# Patient Record
Sex: FEMALE | Race: WHITE | NOT HISPANIC OR LATINO | URBAN - METROPOLITAN AREA
[De-identification: names, ages, dates, MRNs, and addresses within clinical notes are randomized per-mention and may not be internally consistent; named-entity substitution may affect disease eponyms.]

---

## 2023-11-20 ENCOUNTER — OFFICE VISIT (OUTPATIENT)
Dept: OBGYN CLINIC | Facility: CLINIC | Age: 33
End: 2023-11-20
Payer: COMMERCIAL

## 2023-11-20 VITALS
DIASTOLIC BLOOD PRESSURE: 80 MMHG | HEIGHT: 65 IN | BODY MASS INDEX: 22.96 KG/M2 | SYSTOLIC BLOOD PRESSURE: 120 MMHG | WEIGHT: 137.8 LBS

## 2023-11-20 DIAGNOSIS — N91.2 AMENORRHEA: Primary | ICD-10-CM

## 2023-11-20 PROCEDURE — 76817 TRANSVAGINAL US OBSTETRIC: CPT | Performed by: PHYSICIAN ASSISTANT

## 2023-11-20 PROCEDURE — 99214 OFFICE O/P EST MOD 30 MIN: CPT | Performed by: PHYSICIAN ASSISTANT

## 2023-11-20 NOTE — PROGRESS NOTES
Assessment/Plan:  - Viable IUP @ 8w 5d EGA  - CALLY 24  - Continue PNV  - Patient to call for concerns  - RTO 3 weeks for OB intake    Encounter Diagnosis     ICD-10-CM    1. Amenorrhea  N91.2 Ambulatory Referral to Maternal Fetal Medicine                Subjective:       Patient ID: Gena Gomez 1990        Gena Gomez is a 34 y.o.  presenting to the office for pregnancy confirmation. Patient's last menstrual period was 2023 (exact date). , placing her at [id number] [facility] today with CALLY of 24. She is feeling well today        OB History    Para Term  AB Living   1             SAB IAB Ectopic Multiple Live Births                  # Outcome Date GA Lbr Jorden/2nd Weight Sex Delivery Anes PTL Lv   1 Current               Obstetric Comments   Menarche 17          The following portions of the patient's history were reviewed and updated as appropriate: allergies, current medications, past family history, past medical history, past social history, past surgical history, and problem list.    Allergies:  Patient has no known allergies. Medications:    Current Outpatient Medications:     Prenatal MV-Min-Fe Fum-FA-DHA (PRENATAL 1 PO), Take by mouth, Disp: , Rfl:       Review of Systems:   Review of Systems   Constitutional:  Negative for chills, fever and unexpected weight change. Respiratory:  Negative for shortness of breath. Cardiovascular:  Negative for chest pain. Gastrointestinal:  Negative for abdominal pain, diarrhea, nausea and vomiting. Skin:  Negative for rash. Psychiatric/Behavioral:  Negative for dysphoric mood. The patient is not nervous/anxious.            Objective:       Visit Vitals  /80 (BP Location: Left arm, Patient Position: Sitting, Cuff Size: Standard)   Ht 5' 5" (1.651 m)   Wt 62.5 kg (137 lb 12.8 oz)   LMP 2023 (Exact Date)   BMI 22.93 kg/m²   OB Status Pregnant   Smoking Status Never   BSA 1.69 m²        GEN: The patient was alert and oriented x3, pleasant well-appearing female in no acute distress. CV: Regular rate  PULM: nonlabored respirations  MSK: Normal gait  : WNL  Skin: warm, dry  Neuro: no focal deficits  Psych: normal affect and judgement, cooperative    Ultrasound:     Viability US     Gestational sac: present               Location: intrauterine  Yolk sac: present  Fetal pole: present               CRL: 1.77 cm = 8w2d  Cardiac activity: present               Rate: 164 bpm     Ovaries: normal appearing bilaterally  Cul de sac: absence of free fluid  Uterus: normal in appearance           Ultrasound Probe Disinfection    A transvaginal ultrasound was performed.    Prior to use, disinfection was performed with High Level Disinfection Process ([employer]on)  Probe serial number [id number]: [id number] was used    Reuben Bhatti PA-C  11/20/23  10:30 AM

## 2023-12-08 ENCOUNTER — LAB (OUTPATIENT)
Dept: LAB | Facility: AMBULARY SURGERY CENTER | Age: 33
End: 2023-12-08
Payer: COMMERCIAL

## 2023-12-08 ENCOUNTER — INITIAL PRENATAL (OUTPATIENT)
Dept: OBGYN CLINIC | Facility: CLINIC | Age: 33
End: 2023-12-08

## 2023-12-08 VITALS — HEIGHT: 65 IN | WEIGHT: 135.6 LBS | BODY MASS INDEX: 22.59 KG/M2

## 2023-12-08 DIAGNOSIS — Z3A.11 11 WEEKS GESTATION OF PREGNANCY: ICD-10-CM

## 2023-12-08 DIAGNOSIS — Z34.91 ENCOUNTER FOR SUPERVISION OF NORMAL PREGNANCY IN FIRST TRIMESTER, UNSPECIFIED GRAVIDITY: ICD-10-CM

## 2023-12-08 DIAGNOSIS — Z31.438 ENCOUNTER FOR OTHER GENETIC TESTING OF FEMALE FOR PROCREATIVE MANAGEMENT: ICD-10-CM

## 2023-12-08 DIAGNOSIS — Z13.71 SCREENING FOR GENETIC DISEASE CARRIER STATUS: Primary | ICD-10-CM

## 2023-12-08 LAB
ABO GROUP BLD: NORMAL
BASOPHILS # BLD AUTO: 0.02 THOUSANDS/ÂΜL (ref 0–0.1)
BASOPHILS NFR BLD AUTO: 0 % (ref 0–1)
BLD GP AB SCN SERPL QL: NEGATIVE
EOSINOPHIL # BLD AUTO: 0.01 THOUSAND/ÂΜL (ref 0–0.61)
EOSINOPHIL NFR BLD AUTO: 0 % (ref 0–6)
ERYTHROCYTE [DISTWIDTH] IN BLOOD BY AUTOMATED COUNT: 12.3 % (ref 11.6–15.1)
HBV SURFACE AG SER QL: NORMAL
HCT VFR BLD AUTO: 35.6 % (ref 34.8–46.1)
HCV AB SER QL: NORMAL
HGB BLD-MCNC: 11.7 G/DL (ref 11.5–15.4)
IMM GRANULOCYTES # BLD AUTO: 0.03 THOUSAND/UL (ref 0–0.2)
IMM GRANULOCYTES NFR BLD AUTO: 0 % (ref 0–2)
LYMPHOCYTES # BLD AUTO: 2.04 THOUSANDS/ÂΜL (ref 0.6–4.47)
LYMPHOCYTES NFR BLD AUTO: 23 % (ref 14–44)
MCH RBC QN AUTO: 29 PG (ref 26.8–34.3)
MCHC RBC AUTO-ENTMCNC: 32.9 G/DL (ref 31.4–37.4)
MCV RBC AUTO: 88 FL (ref 82–98)
MONOCYTES # BLD AUTO: 0.4 THOUSAND/ÂΜL (ref 0.17–1.22)
MONOCYTES NFR BLD AUTO: 5 % (ref 4–12)
NEUTROPHILS # BLD AUTO: 6.4 THOUSANDS/ÂΜL (ref 1.85–7.62)
NEUTS SEG NFR BLD AUTO: 72 % (ref 43–75)
NRBC BLD AUTO-RTO: 0 /100 WBCS
PLATELET # BLD AUTO: 225 THOUSANDS/UL (ref 149–390)
PMV BLD AUTO: 11 FL (ref 8.9–12.7)
RBC # BLD AUTO: 4.04 MILLION/UL (ref 3.81–5.12)
RH BLD: NEGATIVE
RUBV IGG SERPL IA-ACNC: 190.5 IU/ML
SPECIMEN EXPIRATION DATE: NORMAL
TREPONEMA PALLIDUM IGG+IGM AB [PRESENCE] IN SERUM OR PLASMA BY IMMUNOASSAY: NORMAL
VZV IGG SER QL IA: NORMAL
WBC # BLD AUTO: 8.9 THOUSAND/UL (ref 4.31–10.16)

## 2023-12-08 PROCEDURE — 86787 VARICELLA-ZOSTER ANTIBODY: CPT

## 2023-12-08 PROCEDURE — OBC

## 2023-12-08 PROCEDURE — 86850 RBC ANTIBODY SCREEN: CPT

## 2023-12-08 PROCEDURE — 36415 COLL VENOUS BLD VENIPUNCTURE: CPT

## 2023-12-08 PROCEDURE — 85025 COMPLETE CBC W/AUTO DIFF WBC: CPT

## 2023-12-08 PROCEDURE — 86900 BLOOD TYPING SEROLOGIC ABO: CPT

## 2023-12-08 PROCEDURE — 86780 TREPONEMA PALLIDUM: CPT

## 2023-12-08 PROCEDURE — 87340 HEPATITIS B SURFACE AG IA: CPT

## 2023-12-08 PROCEDURE — 87086 URINE CULTURE/COLONY COUNT: CPT

## 2023-12-08 PROCEDURE — 87389 HIV-1 AG W/HIV-1&-2 AB AG IA: CPT

## 2023-12-08 PROCEDURE — 86803 HEPATITIS C AB TEST: CPT

## 2023-12-08 PROCEDURE — 86762 RUBELLA ANTIBODY: CPT

## 2023-12-08 PROCEDURE — 86901 BLOOD TYPING SEROLOGIC RH(D): CPT

## 2023-12-08 NOTE — PROGRESS NOTES
OB INTAKE INTERVIEW  Pt presents for OB intake. Plan:  - Prenatal labs ordered  - Referral given for MFM   -NT scheduled for   - Reviewed Genetic testing options   -SMA and CF ordered   - Patient to call for concerns  - RTO 4 weeks for OB F/U visit and PAP/Cultures       OB History    Para Term  AB Living   1             SAB IAB Ectopic Multiple Live Births                  # Outcome Date GA Lbr Jorden/2nd Weight Sex Delivery Anes PTL Lv   1 Current               Obstetric Comments   Menarche 15      Hx of  delivery prior to 36 weeks 6 days:  No  Last Menstrual Period:    Patient's last menstrual period was 2023 (exact date). Ultrasound date: 23 8 weeks 5 days     Estimated Date of Delivery: 2024     Current Issues:  Constipation :   No  Headaches :   No  Cramping:  No  Spotting :   No      Interview education  [facility] Pregnancy Essentials reviewed and discussed   Baby and  [address] Handout  [facility] MFM Handout  Discussed genetic testing  Prenatal lab work: Scripts printed and given to pt. Influenza vaccine given today: No  Discussed Tdap vaccine. Immunizations:   Immunization History   Administered Date(s) Administered    COVID-19 MODERNA VACC 0.5 ML IM 12/15/2021     Diabetes              Pregestational DM: No              hx of GDM: No              BMI >35: No              first degree relative with type 2 diabetes: No              hx of PCOS: No              current metformin use:  No              prior hx of LGA/macrosomia: No                Hypertension              Hx of chronic HTN: No              hx of gestational HTN: No              hx of preeclampsia, eclampsia, or HELLP syndrome: No              Family h/o preeclampsia: No              Age 28 or older: No              Multifetal gestation: No  Type 1 or Type 2 DM: No  Renal Disease: No  Autoimmune disease (systemic lupus erythematosus, antiphospholipid antibody syndrome): No  Nulliparity: Yes  Obesity (BMI over 30): No  More than 10 year pregnancy interval: No  Previous IUGR, low birthweight or small for gestational age: No      Immunizations:              influenza vaccine: discussed recommendation, unsure at this time               discussed Tdap vaccine administration at 27-28 weeks   Covid Vaccination: Vaccinated     Dental visit with last 6 months - Yes  PHQ-2/9 score: 0       MyChart activated (not 1518 years of age)?: Yes      The patient was oriented to our practice and all questions were answered.   Interviewed by: Filippo Simpson RN 12/08/23

## 2023-12-09 LAB
BACTERIA UR CULT: NORMAL
HIV 1+2 AB+HIV1 P24 AG SERPL QL IA: NON REACTIVE

## 2023-12-20 ENCOUNTER — ROUTINE PRENATAL (OUTPATIENT)
Facility: HOSPITAL | Age: 33
End: 2023-12-20
Payer: COMMERCIAL

## 2023-12-20 VITALS
HEIGHT: 65 IN | DIASTOLIC BLOOD PRESSURE: 70 MMHG | BODY MASS INDEX: 22.73 KG/M2 | SYSTOLIC BLOOD PRESSURE: 116 MMHG | WEIGHT: 136.4 LBS | HEART RATE: 66 BPM

## 2023-12-20 DIAGNOSIS — Z3A.13 13 WEEKS GESTATION OF PREGNANCY: ICD-10-CM

## 2023-12-20 DIAGNOSIS — Z36.82 ENCOUNTER FOR (NT) NUCHAL TRANSLUCENCY SCAN: Primary | ICD-10-CM

## 2023-12-20 DIAGNOSIS — N91.2 AMENORRHEA: ICD-10-CM

## 2023-12-20 PROCEDURE — 99242 OFF/OP CONSLTJ NEW/EST SF 20: CPT | Performed by: NURSE PRACTITIONER

## 2023-12-20 PROCEDURE — 76813 OB US NUCHAL MEAS 1 GEST: CPT | Performed by: OBSTETRICS & GYNECOLOGY

## 2023-12-20 NOTE — LETTER
"2023     Gill Metcalf PA-C  [address]  [address]  [place] [address]    Patient: Lisa Smiley   YOB: 1990   Date of Visit: 2023       Dear GABRIELLA Metcalf:    Thank you for referring Lisa Smiley to me for evaluation. Below are my notes for this consultation.    If you have questions, please do not hesitate to call me. I look forward to following your patient along with you.         Sincerely,        Greer Sexton MD        CC: No Recipients    Greer Sexton MD  2023  1:24 PM  Sign when Signing Visit  [facility]: Ms. Smiley was seen today at 13w0d for nuchal translucency ultrasound.  See ultrasound report under \"OB Procedures\" tab.      Our recommendations are as follows:  We reviewed the availability of aneuploidy screening, as well as diagnostic testing, which are available to all pregnant women. We reviewed limitations, risks, and benefits of screening and testing. She is interested in Non Invasive Prenatal Screening (NIPS) but needs to check coverage with insurance. Aware to call to schedule blood draw if desires testing.  MSAFP screening should be ordered through your office at 15-20 weeks gestation, and completed prior to fetal anatomic survey.  Additionally, carrier screening should be offered, per ACOG guidelines.  She does not wish to pursue diagnostic testing at this time. A detailed anatomic survey as well as transvaginal cervical length screening are recommended between 18-22 weeks gestation.     We reviewed current recommendations regarding  Flu, COVID and RSV (third trimester) vaccines by the American College of Obstetricians and Gynecologists and the Society for Maternal-Fetal Medicine. We discussed reassuring pregnancy outcome information after vaccination. We discussed the increased severity of infections and the resultant maternal and fetal complications that can arise with a severe infection including "  labor.  Vaccines have been found to generate  antibodies in pregnant and lactating women similar to that observed in non-pregnant women. Vaccine-induced antibody levels were significantly greater than the levels found in response to natural infection. Immune transfer of these antibodies to neonates is found to occur via the placenta and breast milk.    Please don't hesitate to contact our office with any concerns or questions.    -Greer Sexton MD

## 2023-12-20 NOTE — PROGRESS NOTES
"[facility]: Ms. Smiley was seen today at 13w0d for nuchal translucency ultrasound.  See ultrasound report under \"OB Procedures\" tab.      Our recommendations are as follows:  We reviewed the availability of aneuploidy screening, as well as diagnostic testing, which are available to all pregnant women. We reviewed limitations, risks, and benefits of screening and testing. She is interested in Non Invasive Prenatal Screening (NIPS) but needs to check coverage with insurance. Aware to call to schedule blood draw if desires testing.  MSAFP screening should be ordered through your office at 15-20 weeks gestation, and completed prior to fetal anatomic survey.  Additionally, carrier screening should be offered, per ACOG guidelines.  She does not wish to pursue diagnostic testing at this time. A detailed anatomic survey as well as transvaginal cervical length screening are recommended between 18-22 weeks gestation.     We reviewed current recommendations regarding  Flu, COVID and RSV (third trimester) vaccines by the American College of Obstetricians and Gynecologists and the Society for Maternal-Fetal Medicine. We discussed reassuring pregnancy outcome information after vaccination. We discussed the increased severity of infections and the resultant maternal and fetal complications that can arise with a severe infection including  labor.  Vaccines have been found to generate  antibodies in pregnant and lactating women similar to that observed in non-pregnant women. Vaccine-induced antibody levels were significantly greater than the levels found in response to natural infection. Immune transfer of these antibodies to neonates is found to occur via the placenta and breast milk.    Please don't hesitate to contact our office with any concerns or questions.    -Greer Sexton MD  "

## 2024-01-04 NOTE — PROGRESS NOTES
Patient is a 32 YO  female presenting to the office at 15w2d for routine OB care.   Patient is feeling well today. Reports sporadic and infrequent lower abdominal cramping x 2 days.   Fetal heart rate: 150 bpm  BP: 108/68  TWlb  Fetal Movement: not yet    Cramping: yes, mild lower abdominal cramping x 2 days. Very random when this occurs, no pattern to cramps, infrequent. Continue to monitor cramping. TAUS performed today, normal fetal movement, subjectively normal fluid levels. -155 bpm with Doppler.   Bleeding: no  LOF: no  NT/ week scan scheduled: yes,   Anatomy scan scheduled: yes, 24  AFP ordered if indicated: yes  Prenatal labs complete (including Heb B, HIV): yes; date completed 23  Pap collected: not due, completed 10/28/2022  GC collected:yes  OK to transfuse and code  Oriented to practice/delivery location.   RTO 4 weeks

## 2024-01-05 ENCOUNTER — APPOINTMENT (OUTPATIENT)
Dept: LAB | Facility: AMBULARY SURGERY CENTER | Age: 34
End: 2024-01-05
Payer: COMMERCIAL

## 2024-01-05 ENCOUNTER — APPOINTMENT (OUTPATIENT)
Dept: LAB | Facility: CLINIC | Age: 34
End: 2024-01-05
Payer: COMMERCIAL

## 2024-01-05 ENCOUNTER — CLINICAL SUPPORT (OUTPATIENT)
Facility: HOSPITAL | Age: 34
End: 2024-01-05

## 2024-01-05 ENCOUNTER — ROUTINE PRENATAL (OUTPATIENT)
Dept: OBGYN CLINIC | Facility: CLINIC | Age: 34
End: 2024-01-05

## 2024-01-05 VITALS — SYSTOLIC BLOOD PRESSURE: 108 MMHG | WEIGHT: 137 LBS | BODY MASS INDEX: 22.8 KG/M2 | DIASTOLIC BLOOD PRESSURE: 68 MMHG

## 2024-01-05 DIAGNOSIS — Z34.02 ENCOUNTER FOR SUPERVISION OF NORMAL FIRST PREGNANCY, SECOND TRIMESTER: Primary | ICD-10-CM

## 2024-01-05 DIAGNOSIS — Z13.71 SCREENING FOR GENETIC DISEASE CARRIER STATUS: ICD-10-CM

## 2024-01-05 DIAGNOSIS — Z31.438 ENCOUNTER FOR OTHER GENETIC TESTING OF FEMALE FOR PROCREATIVE MANAGEMENT: ICD-10-CM

## 2024-01-05 DIAGNOSIS — Z33.1 PREGNANT STATE, INCIDENTAL: ICD-10-CM

## 2024-01-05 DIAGNOSIS — Z36.9 UNSPECIFIED ANTENATAL SCREENING: Primary | ICD-10-CM

## 2024-01-05 DIAGNOSIS — Z3A.15 15 WEEKS GESTATION OF PREGNANCY: ICD-10-CM

## 2024-01-05 DIAGNOSIS — Z36.9 UNSPECIFIED ANTENATAL SCREENING: ICD-10-CM

## 2024-01-05 PROCEDURE — 36415 COLL VENOUS BLD VENIPUNCTURE: CPT

## 2024-01-05 PROCEDURE — 81220 CFTR GENE COM VARIANTS: CPT

## 2024-01-05 PROCEDURE — PNV

## 2024-01-05 PROCEDURE — 87491 CHLMYD TRACH DNA AMP PROBE: CPT

## 2024-01-05 PROCEDURE — 81329 SMN1 GENE DOS/DELETION ALYS: CPT

## 2024-01-05 PROCEDURE — 87591 N.GONORRHOEAE DNA AMP PROB: CPT

## 2024-01-05 NOTE — PROGRESS NOTES
Initial prenatal visit, pap up to date(10/28/22-wnl), urine gc/chlam testing due today. Patient states she has been experiencing some lower abdominal cramping that started a few days ago, she denies and vaginal bleeding or discharge. Urine dip neg/neg.

## 2024-01-05 NOTE — PROGRESS NOTES
"Patient chose to have [facility] Non-invasive Prenatal Screen without fetal sex.   Patient choose billed through insurance.   Patient assistance program (PAP) application provided to patient no.  PAP sent with specimen No.     Patient given brochure and is aware [employer] will contact patients insurance and coordinate coverage.  Patient made aware she will receive a text message and e-mail from [employer].   Patient informed text message will come from area code  \"415\".   Provided [employer] Client Services # [phone] and web site at [email or web address].    Blood collection tubes labeled with patient identifiers (name, date of birth).     Printed [employer] lab order and test kit given with FED EX packaging (Tracking # [id number]). Patient instructed to take to a [facility] outpatient lab for blood collection.  Requested patient notify MFM (via phone call or [employer] message) when blood collected so office can follow up on results.     Copy of lab order scanned to Epic media.    Maternal Fetal Medicine will have results in approximately 7-10 business days and will call patient or notify via [employer].  Patient aware viewing lab result online will reveal fetal sex If ordered.    Patient verbalized understanding of all instructions and no questions at this time.          "

## 2024-01-06 LAB — MISCELLANEOUS LAB TEST RESULT: NORMAL

## 2024-01-07 LAB
C TRACH DNA SPEC QL NAA+PROBE: NEGATIVE
N GONORRHOEA DNA SPEC QL NAA+PROBE: NEGATIVE

## 2024-01-11 LAB
CITATION REF LAB TEST: NORMAL
CLINICAL INFO: NORMAL
ETHNIC BACKGROUND STATED: NORMAL
GENE DIS ANL CARRIER INTERP-IMP: NORMAL
GENE MUT TESTED BLD/T: NORMAL
LAB DIRECTOR NAME PROVIDER: NORMAL
REASON FOR REFERRAL (NARRATIVE): NORMAL
RECOMMENDATION PATIENT DOC-IMP: NORMAL
REF LAB TEST METHOD: NORMAL
SERVICE CMNT-IMP: NORMAL
SMN1 GENE MUT ANL BLD/T: NORMAL
SPECIMEN SOURCE: NORMAL

## 2024-01-16 LAB
CF COMMENT: NORMAL
CFTR MUT ANL BLD/T: NORMAL

## 2024-01-24 ENCOUNTER — APPOINTMENT (OUTPATIENT)
Dept: LAB | Facility: AMBULARY SURGERY CENTER | Age: 34
End: 2024-01-24
Payer: COMMERCIAL

## 2024-01-24 DIAGNOSIS — Z3A.15 15 WEEKS GESTATION OF PREGNANCY: ICD-10-CM

## 2024-01-24 DIAGNOSIS — Z34.02 ENCOUNTER FOR SUPERVISION OF NORMAL FIRST PREGNANCY, SECOND TRIMESTER: ICD-10-CM

## 2024-01-24 PROCEDURE — 82105 ALPHA-FETOPROTEIN SERUM: CPT

## 2024-01-24 PROCEDURE — 36415 COLL VENOUS BLD VENIPUNCTURE: CPT

## 2024-01-26 LAB
2ND TRIMESTER 4 SCREEN SERPL-IMP: NORMAL
AFP ADJ MOM SERPL: 1.02
AFP INTERP AMN-IMP: NORMAL
AFP INTERP SERPL-IMP: NORMAL
AFP INTERP SERPL-IMP: NORMAL
AFP SERPL-MCNC: 47.9 NG/ML
AGE AT DELIVERY: 32.1 YR
GA METHOD: NORMAL
GA: 18 WEEKS
IDDM PATIENT QL: NO
MULTIPLE PREGNANCY: NO
NEURAL TUBE DEFECT RISK FETUS: NORMAL %

## 2024-01-29 ENCOUNTER — ROUTINE PRENATAL (OUTPATIENT)
Dept: OBGYN CLINIC | Facility: CLINIC | Age: 34
End: 2024-01-29

## 2024-01-29 VITALS — DIASTOLIC BLOOD PRESSURE: 66 MMHG | BODY MASS INDEX: 23.8 KG/M2 | WEIGHT: 143 LBS | SYSTOLIC BLOOD PRESSURE: 108 MMHG

## 2024-01-29 DIAGNOSIS — O26.899 RH NEGATIVE STATE IN ANTEPARTUM PERIOD: Primary | ICD-10-CM

## 2024-01-29 DIAGNOSIS — Z3A.18 18 WEEKS GESTATION OF PREGNANCY: ICD-10-CM

## 2024-01-29 DIAGNOSIS — Z67.91 RH NEGATIVE STATE IN ANTEPARTUM PERIOD: Primary | ICD-10-CM

## 2024-01-29 PROCEDURE — PNV: Performed by: OBSTETRICS & GYNECOLOGY

## 2024-01-29 NOTE — PATIENT INSTRUCTIONS
Please refer to [facility] Pregnancy Essentials Guide  [facility] ([email or web address])  to access our pregnancy essentials reference guide.  Here you will find a lot of information for all trimesters of pregnancy as well as postpartum.  You will be able to access information about medications that are safe to use during pregnancy, warning signs in third trimester, what to pack for your hospital stay and many other useful guides. There is also information on class available through our [facility] Childbirth and Pregnancy Classes  [facility] ([email or web address]).      Please do not hesitate to contact the office through [employer] Access or by calling for [phone] with any questions or concerns.  We look forward to seeing you at your next scheduled visit!

## 2024-01-29 NOTE — PROGRESS NOTES
33 y.o.  female at 18w5d (Estimated Date of Delivery: 24) for PNV.    Pre- Vitals      Flowsheet Row Most Recent Value   Prenatal Assessment    Fetal Heart Rate 150   Fundal Height (cm) 18 cm   Prenatal Vitals    Blood Pressure 108/66   Weight - Scale 64.9 kg (143 lb)   Urine Albumin/Glucose    Dilation/Effacement/Station    Vaginal Drainage    Edema           TW.722 kg (6 lb)    Leakage of fluid: no  Vaginal bleeding: no  Contractions/Cramping: no  Fetal movement: no    Anatomy scan scheduled this Friday.   Not finding out sex of bay til birth.       RTO in 4 weeks.

## 2024-02-02 ENCOUNTER — TELEPHONE (OUTPATIENT)
Dept: PERINATAL CARE | Facility: CLINIC | Age: 34
End: 2024-02-02

## 2024-02-02 NOTE — TELEPHONE ENCOUNTER
Phone call to patient, explained Maternal Fetal Medicine appointment today @ [facility] needs to be rescheduled due to an unexpected change in our sonographer availability. Apologized on behalf of [facility] office for this inconvenience and offered next available detailed scan Augustus 2/6 patient declined for distance.  Offered Eric 2/13 and patient accepted.

## 2024-02-12 ENCOUNTER — TELEPHONE (OUTPATIENT)
Dept: PERINATAL CARE | Facility: OTHER | Age: 34
End: 2024-02-12

## 2024-02-12 NOTE — TELEPHONE ENCOUNTER
Left voicemail for patient that we are cancelling 2/13 appointment due to expected inclement weather. We will call patient back to reschedule.

## 2024-02-14 ENCOUNTER — ROUTINE PRENATAL (OUTPATIENT)
Facility: HOSPITAL | Age: 34
End: 2024-02-14
Payer: COMMERCIAL

## 2024-02-14 VITALS
BODY MASS INDEX: 24.65 KG/M2 | DIASTOLIC BLOOD PRESSURE: 60 MMHG | SYSTOLIC BLOOD PRESSURE: 112 MMHG | HEIGHT: 65 IN | WEIGHT: 147.93 LBS | HEART RATE: 79 BPM

## 2024-02-14 DIAGNOSIS — Z36.3 ENCOUNTER FOR ANTENATAL SCREENING FOR MALFORMATION USING ULTRASOUND: Primary | ICD-10-CM

## 2024-02-14 DIAGNOSIS — Z36.86 ENCOUNTER FOR ANTENATAL SCREENING FOR CERVICAL LENGTH: ICD-10-CM

## 2024-02-14 DIAGNOSIS — Z3A.21 21 WEEKS GESTATION OF PREGNANCY: ICD-10-CM

## 2024-02-14 PROCEDURE — 76805 OB US >/= 14 WKS SNGL FETUS: CPT | Performed by: OBSTETRICS & GYNECOLOGY

## 2024-02-14 PROCEDURE — 76817 TRANSVAGINAL US OBSTETRIC: CPT | Performed by: OBSTETRICS & GYNECOLOGY

## 2024-02-14 NOTE — PROGRESS NOTES
The patient was seen today for an ultrasound.  Please see ultrasound report (located under Ob Procedures) for additional details.   Thank you very much for allowing us to participate in the care of this very nice patient.  Should you have any questions, please do not hesitate to contact me.     Cricket Bills MD FACOG  Attending Physician, Maternal-Fetal Medicine  [facility]

## 2024-02-14 NOTE — PROGRESS NOTES
Ultrasound Probe Disinfection    A transvaginal ultrasound was performed.   Prior to use, disinfection was performed with High Level Disinfection Process ([employer]).  Probe serial number A5: [id number] was used.      Bindu Suarez  02/14/24  8:02 AM

## 2024-02-19 ENCOUNTER — CLINICAL SUPPORT (OUTPATIENT)
Dept: POSTPARTUM | Facility: CLINIC | Age: 34
End: 2024-02-19

## 2024-02-19 DIAGNOSIS — Z32.2 ENCOUNTER FOR CHILDBIRTH INSTRUCTION: Primary | ICD-10-CM

## 2024-02-26 NOTE — PROGRESS NOTES
Patient is a 32 YO  female presenting to the office at 22w6d for routine OB care.   Patient is feeling well today.   28-week labs placed.  Fetal heart rate: 140  BP: 114/68  TWG: 10lb  Fetal Movement: yes  LOF: no  VB: no  CTX: no  Reviewed precautions  Call for concerns  RTO 4 weeks

## 2024-02-27 ENCOUNTER — ROUTINE PRENATAL (OUTPATIENT)
Dept: OBGYN CLINIC | Facility: CLINIC | Age: 34
End: 2024-02-27

## 2024-02-27 VITALS — DIASTOLIC BLOOD PRESSURE: 68 MMHG | BODY MASS INDEX: 24.46 KG/M2 | WEIGHT: 147 LBS | SYSTOLIC BLOOD PRESSURE: 114 MMHG

## 2024-02-27 DIAGNOSIS — O26.899 RH NEGATIVE STATE IN ANTEPARTUM PERIOD: Primary | ICD-10-CM

## 2024-02-27 DIAGNOSIS — Z3A.22 22 WEEKS GESTATION OF PREGNANCY: ICD-10-CM

## 2024-02-27 DIAGNOSIS — Z67.91 RH NEGATIVE STATE IN ANTEPARTUM PERIOD: Primary | ICD-10-CM

## 2024-02-27 PROCEDURE — PNV

## 2024-02-27 NOTE — PROGRESS NOTES
Pt is here for her 22w prenatal. Labs ordered. Pt denies any leakage,bleeding,pressure, and contractions. Urine dip is neg/neg

## 2024-03-20 ENCOUNTER — APPOINTMENT (OUTPATIENT)
Dept: LAB | Facility: CLINIC | Age: 34
End: 2024-03-20
Payer: COMMERCIAL

## 2024-03-20 DIAGNOSIS — Z3A.22 22 WEEKS GESTATION OF PREGNANCY: ICD-10-CM

## 2024-03-20 DIAGNOSIS — Z67.91 RH NEGATIVE STATE IN ANTEPARTUM PERIOD: ICD-10-CM

## 2024-03-20 DIAGNOSIS — O26.899 RH NEGATIVE STATE IN ANTEPARTUM PERIOD: ICD-10-CM

## 2024-03-20 LAB
ABO GROUP BLD: NORMAL
BLD GP AB SCN SERPL QL: NEGATIVE
ERYTHROCYTE [DISTWIDTH] IN BLOOD BY AUTOMATED COUNT: 12.2 % (ref 11.6–15.1)
GLUCOSE 1H P 50 G GLC PO SERPL-MCNC: 89 MG/DL (ref 70–134)
HCT VFR BLD AUTO: 34.8 % (ref 34.8–46.1)
HGB BLD-MCNC: 11.1 G/DL (ref 11.5–15.4)
MCH RBC QN AUTO: 28.8 PG (ref 26.8–34.3)
MCHC RBC AUTO-ENTMCNC: 31.9 G/DL (ref 31.4–37.4)
MCV RBC AUTO: 90 FL (ref 82–98)
PLATELET # BLD AUTO: 217 THOUSANDS/UL (ref 149–390)
PMV BLD AUTO: 9.7 FL (ref 8.9–12.7)
RBC # BLD AUTO: 3.86 MILLION/UL (ref 3.81–5.12)
RH BLD: NEGATIVE
SPECIMEN EXPIRATION DATE: NORMAL
TREPONEMA PALLIDUM IGG+IGM AB [PRESENCE] IN SERUM OR PLASMA BY IMMUNOASSAY: NORMAL
WBC # BLD AUTO: 11.3 THOUSAND/UL (ref 4.31–10.16)

## 2024-03-20 PROCEDURE — 86901 BLOOD TYPING SEROLOGIC RH(D): CPT

## 2024-03-20 PROCEDURE — 86900 BLOOD TYPING SEROLOGIC ABO: CPT

## 2024-03-20 PROCEDURE — 85027 COMPLETE CBC AUTOMATED: CPT

## 2024-03-20 PROCEDURE — 86850 RBC ANTIBODY SCREEN: CPT

## 2024-03-20 PROCEDURE — 86780 TREPONEMA PALLIDUM: CPT

## 2024-03-20 PROCEDURE — 82950 GLUCOSE TEST: CPT

## 2024-03-20 PROCEDURE — 36415 COLL VENOUS BLD VENIPUNCTURE: CPT

## 2024-03-21 ENCOUNTER — TELEPHONE (OUTPATIENT)
Age: 34
End: 2024-03-21

## 2024-03-21 NOTE — TELEPHONE ENCOUNTER
Patient called, reviewed lab results and glucose results specifically. Patient had no additional questions or concerns.

## 2024-03-26 ENCOUNTER — ROUTINE PRENATAL (OUTPATIENT)
Dept: OBGYN CLINIC | Facility: CLINIC | Age: 34
End: 2024-03-26

## 2024-03-26 VITALS — SYSTOLIC BLOOD PRESSURE: 110 MMHG | DIASTOLIC BLOOD PRESSURE: 70 MMHG | WEIGHT: 155 LBS | BODY MASS INDEX: 25.79 KG/M2

## 2024-03-26 DIAGNOSIS — Z67.91 RH NEGATIVE STATE IN ANTEPARTUM PERIOD: Primary | ICD-10-CM

## 2024-03-26 DIAGNOSIS — Z3A.26 26 WEEKS GESTATION OF PREGNANCY: ICD-10-CM

## 2024-03-26 DIAGNOSIS — O26.899 RH NEGATIVE STATE IN ANTEPARTUM PERIOD: Primary | ICD-10-CM

## 2024-03-26 PROCEDURE — PNV: Performed by: OBSTETRICS & GYNECOLOGY

## 2024-03-26 NOTE — PROGRESS NOTES
33 y.o.   female at 26.6 wga for PNV. BP : 110/70. TW  + FM, - LOF, - Ctxn, - bleeding  Feeling well.  No complaints  Reviewed labs    Rhogam next visit    F/u 2 weeks

## 2024-03-27 ENCOUNTER — TELEPHONE (OUTPATIENT)
Age: 34
End: 2024-03-27

## 2024-04-08 ENCOUNTER — ROUTINE PRENATAL (OUTPATIENT)
Dept: OBGYN CLINIC | Facility: CLINIC | Age: 34
End: 2024-04-08
Payer: COMMERCIAL

## 2024-04-08 VITALS — SYSTOLIC BLOOD PRESSURE: 106 MMHG | DIASTOLIC BLOOD PRESSURE: 64 MMHG | BODY MASS INDEX: 25.63 KG/M2 | WEIGHT: 154 LBS

## 2024-04-08 DIAGNOSIS — Z3A.28 28 WEEKS GESTATION OF PREGNANCY: ICD-10-CM

## 2024-04-08 DIAGNOSIS — Z23 NEED FOR TDAP VACCINATION: ICD-10-CM

## 2024-04-08 DIAGNOSIS — O26.893 RH NEGATIVE STATE IN ANTEPARTUM PERIOD, THIRD TRIMESTER: Primary | ICD-10-CM

## 2024-04-08 DIAGNOSIS — Z67.91 RH NEGATIVE STATE IN ANTEPARTUM PERIOD, THIRD TRIMESTER: Primary | ICD-10-CM

## 2024-04-08 DIAGNOSIS — Z29.13 NEED FOR RHOGAM DUE TO RH NEGATIVE MOTHER: ICD-10-CM

## 2024-04-08 PROCEDURE — PNV: Performed by: PHYSICIAN ASSISTANT

## 2024-04-08 PROCEDURE — 96372 THER/PROPH/DIAG INJ SC/IM: CPT | Performed by: PHYSICIAN ASSISTANT

## 2024-04-08 PROCEDURE — 90715 TDAP VACCINE 7 YRS/> IM: CPT | Performed by: PHYSICIAN ASSISTANT

## 2024-04-08 PROCEDURE — 90471 IMMUNIZATION ADMIN: CPT | Performed by: PHYSICIAN ASSISTANT

## 2024-04-08 NOTE — PROGRESS NOTES
Patient is a 34 YO  female presenting to the office at 28.5 weeks for routine OB care.   BP: 106/64  TWlb  Fetal Movement: yes good movement   Feeling well today  LOF: no  VB: no  CTX: no  Discussed third trimester teaching  Reviewed fetal kick counts, normal FM  Reviewed signs of PTL  Reviewed red folder, consents, birth plan  Delivery consent obtained   Breastfeeding: will try  Breast Pump: received  TDAP: received today  FLU Vaccine: recommend  COVID Vaccine: recommend  Rhogam: received today  28 Week Labs: completed and WNL  RTO 2 weeks for routine OB F/U

## 2024-04-08 NOTE — PROGRESS NOTES
Red folder due today.  Rhogam offered and accepted. Rhogam given in left deltoid without difficulty, patient tolerated shot well.   TDAP offered and accepted. Vaccine given in right deltoid without difficulty, patient tolerated shot well.   Breast pump-already submitted by patient.   Patient would like to discuss kick counting and how often you should do them, otherwise she is doing well.   Urine dip neg/neg.

## 2024-04-22 ENCOUNTER — CLINICAL SUPPORT (OUTPATIENT)
Age: 34
End: 2024-04-22

## 2024-04-22 DIAGNOSIS — Z32.2 ENCOUNTER FOR CHILDBIRTH INSTRUCTION: Primary | ICD-10-CM

## 2024-04-23 ENCOUNTER — ROUTINE PRENATAL (OUTPATIENT)
Dept: OBGYN CLINIC | Facility: CLINIC | Age: 34
End: 2024-04-23

## 2024-04-23 VITALS — BODY MASS INDEX: 26.26 KG/M2 | WEIGHT: 157.8 LBS | SYSTOLIC BLOOD PRESSURE: 116 MMHG | DIASTOLIC BLOOD PRESSURE: 80 MMHG

## 2024-04-23 DIAGNOSIS — O26.893 RH NEGATIVE STATE IN ANTEPARTUM PERIOD, THIRD TRIMESTER: Primary | ICD-10-CM

## 2024-04-23 DIAGNOSIS — Z67.91 RH NEGATIVE STATE IN ANTEPARTUM PERIOD, THIRD TRIMESTER: Primary | ICD-10-CM

## 2024-04-23 DIAGNOSIS — Z3A.30 30 WEEKS GESTATION OF PREGNANCY: ICD-10-CM

## 2024-04-23 PROCEDURE — PNV: Performed by: OBSTETRICS & GYNECOLOGY

## 2024-04-23 NOTE — PROGRESS NOTES
Pt is well, no concerns at this time. Denies vb,lof,and ctx.    Urine dip test neg protein/ neg glucose

## 2024-04-23 NOTE — PROGRESS NOTES
33 y.o.   female at 30.6 wga for PNV. BP : 116/80. TW  + FM, - LOF, - Ctxn, - bleeding  Feeling well.  No complaints  Reviewed PTL precautions and FKCs  Reviewed 28 week labs  Rhogan and tdap last visit  F/u 2 weeks

## 2024-05-07 ENCOUNTER — ROUTINE PRENATAL (OUTPATIENT)
Dept: OBGYN CLINIC | Facility: CLINIC | Age: 34
End: 2024-05-07

## 2024-05-07 VITALS — BODY MASS INDEX: 27.06 KG/M2 | DIASTOLIC BLOOD PRESSURE: 68 MMHG | WEIGHT: 162.6 LBS | SYSTOLIC BLOOD PRESSURE: 120 MMHG

## 2024-05-07 DIAGNOSIS — Z67.91 RH NEGATIVE STATE IN ANTEPARTUM PERIOD, THIRD TRIMESTER: Primary | ICD-10-CM

## 2024-05-07 DIAGNOSIS — Z3A.32 32 WEEKS GESTATION OF PREGNANCY: ICD-10-CM

## 2024-05-07 DIAGNOSIS — O26.893 RH NEGATIVE STATE IN ANTEPARTUM PERIOD, THIRD TRIMESTER: Primary | ICD-10-CM

## 2024-05-07 PROCEDURE — PNV

## 2024-05-07 NOTE — PROGRESS NOTES
Patient is a 32 YO  female presenting to the office at 32w6d for routine OB care.   Patient is feeling well today.   Fetal heart rate: 150  Vertex by TAUS  BP: 120/68  TWlb 9.6oz  Fetal Movement: yes  LOF: no  VB: no  CTX: no  Reviewed precautions  Call for concerns  RTO 2 weeks

## 2024-05-13 ENCOUNTER — PATIENT MESSAGE (OUTPATIENT)
Dept: OBGYN CLINIC | Facility: CLINIC | Age: 34
End: 2024-05-13

## 2024-05-14 NOTE — PATIENT COMMUNICATION
Overall how are you feeling? good    Compliant with routine OB appointments? yes    Have you completed your 3rd trimester lab work? yes    Have you reviewed the contents of 3rd trimester folder from office?    Have you decided on a pediatrician? Still deciding   Questions on paperwork to go back to office? no   Questions on the baby birth certificate forms? no    EPDS Scrore 5

## 2024-05-18 ENCOUNTER — CLINICAL SUPPORT (OUTPATIENT)
Age: 34
End: 2024-05-18

## 2024-05-18 DIAGNOSIS — Z32.2 ENCOUNTER FOR CHILDBIRTH INSTRUCTION: Primary | ICD-10-CM

## 2024-05-21 ENCOUNTER — ROUTINE PRENATAL (OUTPATIENT)
Dept: OBGYN CLINIC | Facility: CLINIC | Age: 34
End: 2024-05-21

## 2024-05-21 VITALS — SYSTOLIC BLOOD PRESSURE: 118 MMHG | WEIGHT: 165.8 LBS | BODY MASS INDEX: 27.59 KG/M2 | DIASTOLIC BLOOD PRESSURE: 68 MMHG

## 2024-05-21 DIAGNOSIS — Z3A.34 34 WEEKS GESTATION OF PREGNANCY: ICD-10-CM

## 2024-05-21 DIAGNOSIS — Z67.91 RH NEGATIVE STATE IN ANTEPARTUM PERIOD, THIRD TRIMESTER: Primary | ICD-10-CM

## 2024-05-21 DIAGNOSIS — O26.893 RH NEGATIVE STATE IN ANTEPARTUM PERIOD, THIRD TRIMESTER: Primary | ICD-10-CM

## 2024-05-21 PROCEDURE — PNV: Performed by: PHYSICIAN ASSISTANT

## 2024-05-21 NOTE — PROGRESS NOTES
Patient is a 33 YO  female presenting to the office at 34.6 weeks for routine OB care.   BP: 118/68  TWlb  Fetal Movement: yes good movement  LOF: no  VB: no  CTX: no  Vertex  Reviewed precautions  Call for concerns  RTO 1 weeks

## 2024-06-07 ENCOUNTER — ROUTINE PRENATAL (OUTPATIENT)
Dept: OBGYN CLINIC | Facility: CLINIC | Age: 34
End: 2024-06-07
Payer: COMMERCIAL

## 2024-06-07 VITALS — SYSTOLIC BLOOD PRESSURE: 124 MMHG | DIASTOLIC BLOOD PRESSURE: 70 MMHG | WEIGHT: 167 LBS | BODY MASS INDEX: 27.79 KG/M2

## 2024-06-07 DIAGNOSIS — O26.893 RH NEGATIVE STATE IN ANTEPARTUM PERIOD, THIRD TRIMESTER: ICD-10-CM

## 2024-06-07 DIAGNOSIS — O36.8120 DECREASED FETAL MOVEMENTS IN SECOND TRIMESTER, SINGLE OR UNSPECIFIED FETUS: Primary | ICD-10-CM

## 2024-06-07 DIAGNOSIS — Z67.91 RH NEGATIVE STATE IN ANTEPARTUM PERIOD, THIRD TRIMESTER: ICD-10-CM

## 2024-06-07 DIAGNOSIS — Z3A.37 37 WEEKS GESTATION OF PREGNANCY: ICD-10-CM

## 2024-06-07 PROCEDURE — 87591 N.GONORRHOEAE DNA AMP PROB: CPT | Performed by: OBSTETRICS & GYNECOLOGY

## 2024-06-07 PROCEDURE — 59025 FETAL NON-STRESS TEST: CPT | Performed by: OBSTETRICS & GYNECOLOGY

## 2024-06-07 PROCEDURE — 87150 DNA/RNA AMPLIFIED PROBE: CPT | Performed by: OBSTETRICS & GYNECOLOGY

## 2024-06-07 PROCEDURE — PNV: Performed by: OBSTETRICS & GYNECOLOGY

## 2024-06-07 PROCEDURE — 87491 CHLMYD TRACH DNA AMP PROBE: CPT | Performed by: OBSTETRICS & GYNECOLOGY

## 2024-06-07 NOTE — PROGRESS NOTES
34 y.o.   female at 37.2 wga for PNV. BP : 124/70. TW  decreased FM,  - LOF, - Ctxn, - bleeding    Decreased movement today  - reactive NST  - CHRISTINA 11cm  Reviewed labor precautions and FKCs  GBS, GC/CT collected  F/u 1 week

## 2024-06-09 LAB
C TRACH DNA SPEC QL NAA+PROBE: NEGATIVE
GP B STREP DNA SPEC QL NAA+PROBE: NEGATIVE
N GONORRHOEA DNA SPEC QL NAA+PROBE: NEGATIVE

## 2024-06-11 ENCOUNTER — ROUTINE PRENATAL (OUTPATIENT)
Dept: OBGYN CLINIC | Facility: CLINIC | Age: 34
End: 2024-06-11

## 2024-06-11 VITALS — DIASTOLIC BLOOD PRESSURE: 72 MMHG | WEIGHT: 169 LBS | SYSTOLIC BLOOD PRESSURE: 120 MMHG | BODY MASS INDEX: 28.12 KG/M2

## 2024-06-11 DIAGNOSIS — Z67.91 RH NEGATIVE STATE IN ANTEPARTUM PERIOD, THIRD TRIMESTER: Primary | ICD-10-CM

## 2024-06-11 DIAGNOSIS — O26.893 RH NEGATIVE STATE IN ANTEPARTUM PERIOD, THIRD TRIMESTER: Primary | ICD-10-CM

## 2024-06-11 DIAGNOSIS — Z3A.37 37 WEEKS GESTATION OF PREGNANCY: ICD-10-CM

## 2024-06-11 PROCEDURE — PNV: Performed by: OBSTETRICS & GYNECOLOGY

## 2024-06-11 NOTE — PATIENT INSTRUCTIONS
Am I in labour?    As you enter the final stages of your pregnancy, your body will give signs that labour is approaching. The following information should help you to understand these signs and make it easier for you to determine whether you are in labour.    Some of the signs and symptoms of going into labour may include:    period-like cramps  backache  diarrhoea  mucous discharge or ‘show’  gush or trickle of water as the membranes break  contractions  Engagement  As you move closer to delivery, your baby’s head may drop and become engaged in your pelvis in preparation for labour. If you are expecting your first baby, you may notice pressure in your groin and on your bladder beginning up to four weeks before the birth. You may also notice that you can breathe a little easier and have a little more appetite as your baby drops, and is not pushed up against your diaphragm and stomach quite so much. This is sometimes known as “lightening”, as women generally feel lighter.    Show    During your pregnancy a mucous plug fills your cervix. Towards the end of pregnancy, the cervix becomes softer and this mucous plug may become loosened and start to come away. The process of discharging this mucous is called a ‘show’ and might often contain streaks of blood or may also be brownish in colour. This is different from any flow of fresh blood which you would report immediately to your doctor or the hospital. The show may continue over a period of hours or even days. It is one of the signs that your body is preparing for birth. Labour may begin in the next few days, hours or weeks following a show. There is no need to phone the hospital if you have had a show.    Water breaking (rupture of membranes)    This may occur at any time prior to the start of labour, or at any time during labour. The break may be low, near the opening of the uterus, and will produce a gush of amniotic fluid. If this occurs, place a sanitary pad on and  "note the colour of the fluid. Ring the hospital and tell the midwife that this has occurred. You will usually be asked to come in to the hospital.    Another type of amniotic fluid leak may occur higher up in the amniotic sack, or top of the uterus. This will be less obvious to you and you may only notice a trickle of fluid. Since many women have a heavy vaginal discharge or leak a small amount of urine towards the end of their pregnancy and it is often difficult to tell the difference between urine and amniotic fluid - urine is often yellow; where amniotic fluid is usually clear, or has a pink tinge, and has a \"sweet\" odour. If you are unsure, ring the hospital.    If the colour of the fluid is green or brownish, it indicates that your baby has passed a bowel motion (meconium) inside the uterus. It is very common to have meconium-stained amniotic fluid in a pregnancy over 41 weeks, but this may also be a sign that your baby is distressed. You will need to ring the hospital immediately and then come into the hospital.    Contractions    [place] contractions  [place] contractions are sometimes mistaken for labour. These “practice” contractions usually start MCFP through the pregnancy and continue right through to the end. These contractions are often irregular and can be uncomfortable and tight. [place] contractions usually increase in regularity and strength towards the end of your pregnancy, preparing your uterus for the birth. Sometimes it is difficult to distinguish between these [place] contractions and labour contractions. Below are the common differences between the two.    Labour contractions  True labour contractions usually increase in strength and duration. In order to time your contractions, time the interval between the start of one contraction to the start of the next. Early labour contractions are often likened to period cramps with or without backache.    [place] " contractions    Labour contractions    usually irregular and short    become regular with time    do not get closer together    get closer together with time    do not get stronger     become stronger    walking does not make them stronger  walking can make them stronger    lying down may make them go away   lying down does not make them go away    uncomfortable and tight--not painful   Painful - can't walk, talk or breathe through them        How does labour start?    Labour can start in different ways. You may be start experiencing some period like pains or contractions. You might notice that these tightenings/contractions start to get stronger, closer and last longer than before. Or you might start with some back ache or a stomach upset that gets stronger and develops into regular contractions. In approximately 10-15% of women, labour will start when your membranes rupture. Contractions usually follow.    Should I call my doctor?    You should call your doctor at [phone] when:    - your mireles break  - you have bright blood loss  - your contractions are regular and five minutes apart  - if you have decreased fetal movement

## 2024-06-11 NOTE — PROGRESS NOTES
Pt is having tightness in belly and pelvic pain. Denies vb,lof,and ctx.     Undressed for cervical check

## 2024-06-11 NOTE — PROGRESS NOTES
34 y.o.  female at 37w6d (Estimated Date of Delivery: 24) for PNV.    Pre- Vitals      Flowsheet Row Most Recent Value   Prenatal Assessment    Fetal Heart Rate 155   Movement Present   Prenatal Vitals    Blood Pressure 120/72   Weight - Scale 76.7 kg (169 lb)   Urine Albumin/Glucose    Dilation/Effacement/Station    Cervical Dilation 0   Cervical Effacement 0   Fetal Station -2   Vaginal Drainage    Edema           TW.5 kg (32 lb)    Leakage of fluid: no  Vaginal bleeding: no  Contractions/Cramping: having belly tightening. Discussed BH contractions vs labor contractions.   Fetal movement: yes  GBS negative.   Labor precautions reviewed.   SVE closed/thick/-2, soft, posterior.   RTO in 1 weeks.

## 2024-06-21 ENCOUNTER — ROUTINE PRENATAL (OUTPATIENT)
Dept: OBGYN CLINIC | Facility: CLINIC | Age: 34
End: 2024-06-21

## 2024-06-21 VITALS — WEIGHT: 170 LBS | BODY MASS INDEX: 28.29 KG/M2 | DIASTOLIC BLOOD PRESSURE: 80 MMHG | SYSTOLIC BLOOD PRESSURE: 120 MMHG

## 2024-06-21 DIAGNOSIS — Z3A.39 39 WEEKS GESTATION OF PREGNANCY: Primary | ICD-10-CM

## 2024-06-21 DIAGNOSIS — Z67.91 RH NEGATIVE STATE IN ANTEPARTUM PERIOD, THIRD TRIMESTER: ICD-10-CM

## 2024-06-21 DIAGNOSIS — O26.893 RH NEGATIVE STATE IN ANTEPARTUM PERIOD, THIRD TRIMESTER: ICD-10-CM

## 2024-06-21 PROCEDURE — PNV: Performed by: OBSTETRICS & GYNECOLOGY

## 2024-06-21 NOTE — PROGRESS NOTES
34 y.o.  female at 39w2d (Estimated Date of Delivery: 24) for PNV.    Pre- Vitals      Flowsheet Row Most Recent Value   Prenatal Assessment    Fetal Heart Rate 140   Movement Present   Prenatal Vitals    Blood Pressure 120/80   Weight - Scale 77.1 kg (170 lb)   Urine Albumin/Glucose    Dilation/Effacement/Station    Cervical Dilation 1   Cervical Effacement 60   Fetal Station -2   Vaginal Drainage    Edema           TWG: 15 kg (33 lb)    Leakage of fluid: no  Vaginal bleeding: no  Contractions/Cramping: yes  Fetal movement: yes  Would like induction after EDC, nothing available end of next week, will continue to watch schedule.     RTO Tuesday as scheduled.

## 2024-06-23 ENCOUNTER — ANESTHESIA (INPATIENT)
Dept: ANESTHESIOLOGY | Facility: HOSPITAL | Age: 34
End: 2024-06-23
Payer: COMMERCIAL

## 2024-06-23 ENCOUNTER — HOSPITAL ENCOUNTER (INPATIENT)
Facility: HOSPITAL | Age: 34
LOS: 2 days | Discharge: HOME | End: 2024-06-25
Attending: OBSTETRICS & GYNECOLOGY | Admitting: OBSTETRICS & GYNECOLOGY
Payer: COMMERCIAL

## 2024-06-23 ENCOUNTER — ANESTHESIA EVENT (INPATIENT)
Dept: ANESTHESIOLOGY | Facility: HOSPITAL | Age: 34
End: 2024-06-23
Payer: COMMERCIAL

## 2024-06-23 DIAGNOSIS — Z3A.39 39 WEEKS GESTATION OF PREGNANCY: Primary | ICD-10-CM

## 2024-06-23 PROBLEM — O42.90 LEAKAGE OF AMNIOTIC FLUID: Status: ACTIVE | Noted: 2024-06-23

## 2024-06-23 PROBLEM — R03.0 ELEVATED BP WITHOUT DIAGNOSIS OF HYPERTENSION: Status: ACTIVE | Noted: 2024-06-23

## 2024-06-23 LAB
ABO GROUP BLD: NORMAL
ALBUMIN SERPL BCG-MCNC: 3.7 G/DL (ref 3.5–5)
ALP SERPL-CCNC: 118 U/L (ref 34–104)
ALT SERPL W P-5'-P-CCNC: 12 U/L (ref 7–52)
ANION GAP SERPL CALCULATED.3IONS-SCNC: 10 MMOL/L (ref 4–13)
AST SERPL W P-5'-P-CCNC: 30 U/L (ref 13–39)
BASE EXCESS BLDCOA CALC-SCNC: -6.6 MMOL/L (ref 3–11)
BASE EXCESS BLDCOV CALC-SCNC: -5 MMOL/L (ref 1–9)
BILIRUB SERPL-MCNC: 0.29 MG/DL (ref 0.2–1)
BLD GP AB SCN SERPL QL: POSITIVE
BLOOD GROUP ANTIBODIES SERPL: NORMAL
BUN SERPL-MCNC: 11 MG/DL (ref 5–25)
CALCIUM SERPL-MCNC: 9.1 MG/DL (ref 8.4–10.2)
CHLORIDE SERPL-SCNC: 103 MMOL/L (ref 96–108)
CO2 SERPL-SCNC: 21 MMOL/L (ref 21–32)
CREAT SERPL-MCNC: 0.61 MG/DL (ref 0.6–1.3)
ERYTHROCYTE [DISTWIDTH] IN BLOOD BY AUTOMATED COUNT: 13.5 % (ref 11.6–15.1)
GFR SERPL CREATININE-BSD FRML MDRD: 120 ML/MIN/1.73SQ M
GLUCOSE SERPL-MCNC: 72 MG/DL (ref 65–140)
HCO3 BLDCOA-SCNC: 20.6 MMOL/L (ref 17.3–27.3)
HCO3 BLDCOV-SCNC: 20.5 MMOL/L (ref 12.2–28.6)
HCT VFR BLD AUTO: 35.3 % (ref 34.8–46.1)
HGB BLD-MCNC: 11.3 G/DL (ref 11.5–15.4)
MCH RBC QN AUTO: 27.8 PG (ref 26.8–34.3)
MCHC RBC AUTO-ENTMCNC: 32 G/DL (ref 31.4–37.4)
MCV RBC AUTO: 87 FL (ref 82–98)
O2 CT VFR BLDCOA CALC: 6.7 ML/DL
OXYHGB MFR BLDCOA: 33 %
OXYHGB MFR BLDCOV: 61.6 %
PCO2 BLDCOA: 47.2 MM[HG] (ref 30–60)
PCO2 BLDCOV: 40 MM HG (ref 27–43)
PH BLDCOA: 7.26 [PH] (ref 7.23–7.43)
PH BLDCOV: 7.33 [PH] (ref 7.19–7.49)
PLATELET # BLD AUTO: 222 THOUSANDS/UL (ref 149–390)
PMV BLD AUTO: 11.3 FL (ref 8.9–12.7)
PO2 BLDCOA: 18.6 MM HG (ref 5–25)
PO2 BLDCOV: 27.3 MM HG (ref 15–45)
POTASSIUM SERPL-SCNC: 4.2 MMOL/L (ref 3.5–5.3)
PROT SERPL-MCNC: 7.1 G/DL (ref 6.4–8.4)
RBC # BLD AUTO: 4.07 MILLION/UL (ref 3.81–5.12)
RH BLD: NEGATIVE
SAO2 % BLDCOV: 13.1 ML/DL
SODIUM SERPL-SCNC: 134 MMOL/L (ref 135–147)
SPECIMEN EXPIRATION DATE: NORMAL
WBC # BLD AUTO: 14.19 THOUSAND/UL (ref 4.31–10.16)

## 2024-06-23 PROCEDURE — 80053 COMPREHEN METABOLIC PANEL: CPT

## 2024-06-23 PROCEDURE — 59400 OBSTETRICAL CARE: CPT | Performed by: OBSTETRICS & GYNECOLOGY

## 2024-06-23 PROCEDURE — 86870 RBC ANTIBODY IDENTIFICATION: CPT

## 2024-06-23 PROCEDURE — 86780 TREPONEMA PALLIDUM: CPT

## 2024-06-23 PROCEDURE — 4A1HXCZ MONITORING OF PRODUCTS OF CONCEPTION, CARDIAC RATE, EXTERNAL APPROACH: ICD-10-PCS | Performed by: OBSTETRICS & GYNECOLOGY

## 2024-06-23 PROCEDURE — 86850 RBC ANTIBODY SCREEN: CPT

## 2024-06-23 PROCEDURE — 85027 COMPLETE CBC AUTOMATED: CPT

## 2024-06-23 PROCEDURE — NC001 PR NO CHARGE: Performed by: OBSTETRICS & GYNECOLOGY

## 2024-06-23 PROCEDURE — G0463 HOSPITAL OUTPT CLINIC VISIT: HCPCS

## 2024-06-23 PROCEDURE — 99214 OFFICE O/P EST MOD 30 MIN: CPT

## 2024-06-23 PROCEDURE — 86901 BLOOD TYPING SEROLOGIC RH(D): CPT

## 2024-06-23 PROCEDURE — 0KQM0ZZ REPAIR PERINEUM MUSCLE, OPEN APPROACH: ICD-10-PCS | Performed by: OBSTETRICS & GYNECOLOGY

## 2024-06-23 PROCEDURE — 82805 BLOOD GASES W/O2 SATURATION: CPT | Performed by: OBSTETRICS & GYNECOLOGY

## 2024-06-23 PROCEDURE — 86900 BLOOD TYPING SEROLOGIC ABO: CPT

## 2024-06-23 RX ORDER — SIMETHICONE 80 MG
80 TABLET,CHEWABLE ORAL 4 TIMES DAILY PRN
Status: DISCONTINUED | OUTPATIENT
Start: 2024-06-23 | End: 2024-06-25 | Stop reason: HOSPADM

## 2024-06-23 RX ORDER — ACETAMINOPHEN 325 MG/1
650 TABLET ORAL EVERY 4 HOURS PRN
Status: DISCONTINUED | OUTPATIENT
Start: 2024-06-23 | End: 2024-06-25 | Stop reason: HOSPADM

## 2024-06-23 RX ORDER — ONDANSETRON 2 MG/ML
4 INJECTION INTRAMUSCULAR; INTRAVENOUS EVERY 8 HOURS PRN
Status: DISCONTINUED | OUTPATIENT
Start: 2024-06-23 | End: 2024-06-25 | Stop reason: HOSPADM

## 2024-06-23 RX ORDER — BUPIVACAINE HYDROCHLORIDE 2.5 MG/ML
INJECTION, SOLUTION EPIDURAL; INFILTRATION; INTRACAUDAL AS NEEDED
Status: DISCONTINUED | OUTPATIENT
Start: 2024-06-23 | End: 2024-06-24 | Stop reason: HOSPADM

## 2024-06-23 RX ORDER — IBUPROFEN 600 MG/1
600 TABLET ORAL EVERY 6 HOURS
Status: DISCONTINUED | OUTPATIENT
Start: 2024-06-23 | End: 2024-06-25 | Stop reason: HOSPADM

## 2024-06-23 RX ORDER — DOCUSATE SODIUM 100 MG/1
100 CAPSULE, LIQUID FILLED ORAL 2 TIMES DAILY
Status: DISCONTINUED | OUTPATIENT
Start: 2024-06-23 | End: 2024-06-25 | Stop reason: HOSPADM

## 2024-06-23 RX ORDER — OXYTOCIN/RINGER'S LACTATE 30/500 ML
PLASTIC BAG, INJECTION (ML) INTRAVENOUS
Status: COMPLETED
Start: 2024-06-23 | End: 2024-06-23

## 2024-06-23 RX ORDER — ACETAMINOPHEN 325 MG/1
650 TABLET ORAL EVERY 6 HOURS PRN
Status: DISCONTINUED | OUTPATIENT
Start: 2024-06-23 | End: 2024-06-23

## 2024-06-23 RX ORDER — LIDOCAINE HYDROCHLORIDE AND EPINEPHRINE 15; 5 MG/ML; UG/ML
INJECTION, SOLUTION EPIDURAL AS NEEDED
Status: DISCONTINUED | OUTPATIENT
Start: 2024-06-23 | End: 2024-06-24 | Stop reason: HOSPADM

## 2024-06-23 RX ORDER — BUPIVACAINE HYDROCHLORIDE 2.5 MG/ML
30 INJECTION, SOLUTION EPIDURAL; INFILTRATION; INTRACAUDAL ONCE AS NEEDED
Status: DISCONTINUED | OUTPATIENT
Start: 2024-06-23 | End: 2024-06-23

## 2024-06-23 RX ORDER — CALCIUM CARBONATE 500 MG/1
1000 TABLET, CHEWABLE ORAL DAILY PRN
Status: DISCONTINUED | OUTPATIENT
Start: 2024-06-23 | End: 2024-06-25 | Stop reason: HOSPADM

## 2024-06-23 RX ORDER — OXYTOCIN/RINGER'S LACTATE 30/500 ML
250 PLASTIC BAG, INJECTION (ML) INTRAVENOUS ONCE
Status: DISCONTINUED | OUTPATIENT
Start: 2024-06-23 | End: 2024-06-25 | Stop reason: HOSPADM

## 2024-06-23 RX ORDER — SODIUM CHLORIDE, SODIUM LACTATE, POTASSIUM CHLORIDE, CALCIUM CHLORIDE 600; 310; 30; 20 MG/100ML; MG/100ML; MG/100ML; MG/100ML
125 INJECTION, SOLUTION INTRAVENOUS CONTINUOUS
Status: DISCONTINUED | OUTPATIENT
Start: 2024-06-23 | End: 2024-06-25 | Stop reason: HOSPADM

## 2024-06-23 RX ORDER — ONDANSETRON 2 MG/ML
4 INJECTION INTRAMUSCULAR; INTRAVENOUS EVERY 4 HOURS PRN
Status: DISCONTINUED | OUTPATIENT
Start: 2024-06-23 | End: 2024-06-23

## 2024-06-23 RX ORDER — DIPHENHYDRAMINE HCL 25 MG
25 TABLET ORAL EVERY 6 HOURS PRN
Status: DISCONTINUED | OUTPATIENT
Start: 2024-06-23 | End: 2024-06-25 | Stop reason: HOSPADM

## 2024-06-23 RX ORDER — BENZOCAINE/MENTHOL 6 MG-10 MG
1 LOZENGE MUCOUS MEMBRANE DAILY PRN
Status: DISCONTINUED | OUTPATIENT
Start: 2024-06-23 | End: 2024-06-25 | Stop reason: HOSPADM

## 2024-06-23 RX ADMIN — WITCH HAZEL 1 PAD: 500 SOLUTION RECTAL; TOPICAL at 20:13

## 2024-06-23 RX ADMIN — BUPIVACAINE HYDROCHLORIDE 5 ML: 2.5 INJECTION, SOLUTION EPIDURAL; INFILTRATION; INTRACAUDAL; PERINEURAL at 08:56

## 2024-06-23 RX ADMIN — Medication 250 UNITS: at 18:36

## 2024-06-23 RX ADMIN — ROPIVACAINE HYDROCHLORIDE: 2 INJECTION, SOLUTION EPIDURAL; INFILTRATION at 09:01

## 2024-06-23 RX ADMIN — SODIUM CHLORIDE, SODIUM LACTATE, POTASSIUM CHLORIDE, AND CALCIUM CHLORIDE 125 ML/HR: .6; .31; .03; .02 INJECTION, SOLUTION INTRAVENOUS at 05:49

## 2024-06-23 RX ADMIN — ROPIVACAINE HYDROCHLORIDE: 2 INJECTION, SOLUTION EPIDURAL; INFILTRATION at 17:01

## 2024-06-23 RX ADMIN — LIDOCAINE HYDROCHLORIDE AND EPINEPHRINE 3 ML: 15; 5 INJECTION, SOLUTION EPIDURAL at 08:50

## 2024-06-23 RX ADMIN — HYDROCORTISONE 1 APPLICATION: 1 CREAM TOPICAL at 20:13

## 2024-06-23 RX ADMIN — SODIUM CHLORIDE, SODIUM LACTATE, POTASSIUM CHLORIDE, AND CALCIUM CHLORIDE 999 ML/HR: .6; .31; .03; .02 INJECTION, SOLUTION INTRAVENOUS at 08:00

## 2024-06-23 RX ADMIN — BENZOCAINE AND LEVOMENTHOL 1 APPLICATION: 200; 5 SPRAY TOPICAL at 20:13

## 2024-06-23 RX ADMIN — ACETAMINOPHEN 650 MG: 325 TABLET, FILM COATED ORAL at 15:49

## 2024-06-23 RX ADMIN — SODIUM CHLORIDE, SODIUM LACTATE, POTASSIUM CHLORIDE, AND CALCIUM CHLORIDE 999 ML/HR: .6; .31; .03; .02 INJECTION, SOLUTION INTRAVENOUS at 08:48

## 2024-06-23 RX ADMIN — DOCUSATE SODIUM 100 MG: 100 CAPSULE, LIQUID FILLED ORAL at 20:13

## 2024-06-23 RX ADMIN — IBUPROFEN 600 MG: 600 TABLET, FILM COATED ORAL at 20:13

## 2024-06-23 RX ADMIN — ONDANSETRON 4 MG: 2 INJECTION INTRAMUSCULAR; INTRAVENOUS at 12:28

## 2024-06-23 NOTE — ASSESSMENT & PLAN NOTE
First elevated BP of this pregnancy in triage on admission   Asymptomatic at this time  Cycle BP  Admission CBC, CMP wnl  Consider urine PC  Asymptomatic at this time  Systolic (12hrs), Av , Min:91 , Max:138   Diastolic (12hrs), Av, Min:59, Max:86

## 2024-06-23 NOTE — DISCHARGE SUMMARY
Discharge Summary - OB/GYN  Lisa Smiley 34 y.o. female MRN: [id number]  Unit/Bed#: -01 Encounter: [id number]    Admission Date: 2024     Discharge Date: 2024    Admitting Attending: Susanne Dowling, *    Delivering Attending: Nitesh     Discharging Attending: Ramon    Principal Diagnosis: Pregnancy at 39w4d    Secondary Diagnosis:   1. GHTN  2. Rh negative   3. SROM    Procedures: spontaneous vaginal delivery    Anesthesia: epidural    Hospital course: Lisa Smiley is a 34 y.o.  at 39w4d who was initially admitted for SROM. She was was expectantly managed and progressed in labor on her own. She received an epidural for analgesia. She progressed to complete and started pushing. She met criteria for GHTN intrapartum with mild range blood pressure elevations. CBC and CMP were wnl.     She delivered a viable male  on 2024 at 1834. Weight 6lbs 7oz via normal spontaneous vaginal delivery. She sustained a second degree perineal laceration during delivery which was adequately repaired. Apgars were 9 (1 min) and 9 (5 min).  was transferred to  nursery. Patient tolerated the procedure well.     Her post-delivery course was complicated by Manual extraction of placenta. Her postpartum pain was well controlled with oral analgesics.    On day of discharge, she was ambulating and able to reasonably perform all ADLs. She was voiding and had appropriate bowel function. Pain was well controlled. She was discharged home on postpartum day #2 without complications. Patient was instructed to follow up with her OB as an outpatient and was given appropriate warnings to call provider if she develops signs of infection or uncontrolled pain.    Complications: none apparent    Condition at discharge: good     Discharge instructions/Information to patient and family:   See after visit summary for information provided to patient and family.      Provisions for Follow-Up Care:  See after  visit summary for information related to follow-up care and any pertinent home health orders.      Disposition: See After Visit Summary for discharge disposition information.    Planned Readmission: No    Discharge medications and instructions:   Please see AVS for full list of medications upon discharge.      Ji KO PGY-1

## 2024-06-23 NOTE — ANESTHESIA PROCEDURE NOTES
Epidural Block    Patient location during procedure: OB/L&D  Start time: 6/23/2024 8:47 AM  Staffing  Performed by: Yusuf Hutchins CRNA  Authorized by: Ji Albarran MD    Preanesthetic Checklist  Completed: patient identified, IV checked, site marked, risks and benefits discussed, surgical consent, monitors and equipment checked, pre-op evaluation and timeout performed  Epidural  Patient position: sitting  Prep: ChloraPrep  Sedation Level: no sedation  Patient monitoring: heart rate, frequent blood pressure checks and continuous pulse oximetry  Approach: midline  Location: lumbar, L1-2  Injection technique: ELMER air  Needle  Needle type: Tuohy   Needle gauge: 17 G  Needle insertion depth: 5 cm  Catheter type: side hole  Catheter size: 19 G  Catheter at skin depth: 10 cm  Catheter securement method: stabilization device  Test dose: negative  Assessment  Sensory level: T4  Number of attempts: 1no paresthesia on injection, negative aspiration for heme and negative aspiration for CSF  patient tolerated the procedure well with no immediate complications

## 2024-06-23 NOTE — OB LABOR/OXYTOCIN SAFETY PROGRESS
Labor Progress Note - Lisa Smiley 34 y.o. female MRN: [id number]    Unit/Bed#: -01 Encounter: [id number]       Contraction Frequency (minutes): 2-6  Contraction Intensity: Mild  Uterine Activity Characteristics: Irregular  Cervical Dilation: 3        Cervical Effacement: 90  Fetal Station: -1  Baseline Rate (FHR): 150 bpm  Fetal Heart Rate (FHT): 145 BPM  FHR Category: I               Vital Signs:   Vitals:    06/23/24 0902   BP: 127/77   Pulse: 81   Resp:    Temp:    SpO2:        Notes/comments:   Patient comfortable with epidural. SVE in place. FHT reactive. Continue to monitor.     Susi Tobar MD 6/23/2024 9:20 AM

## 2024-06-23 NOTE — DISCHARGE INSTRUCTIONS
Vaginal Delivery   WHAT YOU SHOULD KNOW:   A vaginal delivery is the birth of your baby through your vagina (birth canal).        AFTER YOU LEAVE:   Medicines:  NSAIDs  help decrease swelling and pain or fever. This medicine is available with or without a doctor's order. NSAIDs can cause stomach bleeding or kidney problems in certain people. If you take blood thinner medicine, always ask your healthcare provider if NSAIDs are safe for you. Always read the medicine label and follow directions.    Take your medicine as directed.  Call your healthcare provider if you think your medicine is not helping or if you have side effects. Tell him if you are allergic to any medicine. Keep a list of the medicines, vitamins, and herbs you take. Include the amounts, and when and why you take them. Bring the list or the pill bottles to follow-up visits. Carry your medicine list with you in case of an emergency.  Follow up with your primary healthcare provider:  Most women need to return 6 weeks after a vaginal delivery. Ask about how to care for your wounds or stitches. Write down your questions so you remember to ask them during your visits.  Activity:  Rest as much as possible. Try to keep all activities short. You may be able to do some exercise soon after you have your baby. Talk with your primary healthcare provider before you start exercising. If you work outside the home, ask when you can return to your job.  Kegel exercises:  Kegel exercises may help your vaginal and rectal muscles heal faster. You can do Kegel exercises by tightening and relaxing the muscles around your vagina. Kegel exercises help make the muscles stronger.   Breast care:  When your milk comes in, your breasts may feel full and hard. Ask how to care for your breasts, even if you are not breastfeeding.   Constipation:  Do not try to push the bowel movement out if it is too hard. High-fiber foods, extra liquids, and regular exercise can help you prevent  constipation. Examples of high-fiber foods are fruit and bran. Prune juice and water are good liquids to drink. Regular exercise helps your digestive system work. You may also be told to take over-the-counter fiber and stool softener medicines. Take these items as directed.   Hemorrhoids:  Pregnancy can cause severe hemorrhoids. You may have rectal pain because of the hemorrhoids. Ask how to prevent or treat hemorrhoids.  Perineum care:  Your perineum is the area between your vagina and anus. Keep the area clean and dry to help it heal and to prevent infection. Wash the area gently with soap and water when you bathe or shower. Rinse your perineum with warm water when you use the toilet. Your primary healthcare provider may suggest you use a warm sitz bath to help decrease pain. A sitz bath is a bathtub or basin filled to hip level. Stay in the sitz bath for 20 to 30 minutes, or as directed.   Vaginal discharge:  You will have vaginal discharge, called lochia, after your delivery. The lochia is bright red the first day or two after the birth. By the fourth day, the amount decreases, and it turns red-brown. Use a sanitary pad rather than a tampon to prevent a vaginal infection. It is normal to have lochia up to 8 weeks after your baby is born.   Monthly periods:  Your period may start again within 7 to 12 weeks after your baby is born. If you are breastfeeding, it may take longer for your period to start again. You can still get pregnant again even though you do not have your monthly period. Talk with your primary healthcare provider about a birth control method that will be good for you if you do not want to get pregnant.  Mood changes:  Many new mothers have some kind of mood changes after delivery. Some of these changes occur because of lack of sleep, hormone changes, and caring for a new baby. Some mood changes can be more serious, such as postpartum depression. Talk with your primary healthcare provider if you  feel unable to care for yourself or your baby.  Sexual activity:  You may need to avoid sex for 6 to 7 weeks after you have your baby. You may notice you have a decreased desire for sex, or sex may be painful. You may need to use a vaginal lubricant (gel) to help make sex more comfortable.  Contact your primary healthcare provider if:   You have heavy vaginal bleeding that fills 1 or more sanitary pads in 1 hour.    You have a fever.    Your pain does not go away, or gets worse.    The skin between your vagina and rectum is swollen, warm, or red.    You have swollen, hard, or painful breasts.    You feel very sad or depressed.    You feel more tired than usual.     You have questions or concerns about your condition or care.  Seek care immediately or call 911 if:   You have pus or yellow drainage coming from your vagina or wound.    You are urinating very little, or not at all.    Your arm or leg feels warm, tender, and painful. It may look swollen and red.    You feel lightheaded, have sudden and worsening chest pain, or trouble breathing. You may have more pain when you take deep breaths or cough, or you may cough up blood.  © 2014 [employer]. Information is for End User's use only and may not be sold, redistributed or otherwise used for commercial purposes. All illustrations and images included in CareNotes® are the copyrighted property of [employer]. or [employer].  The above information is an  only. It is not intended as medical advice for individual conditions or treatments. Talk to your doctor, nurse or pharmacist before following any medical regimen to see if it is safe and effective for you.    Postpartum Perineal Care   WHAT YOU NEED TO KNOW:   Postpartum perineal care is care for your perineum after you have a baby. The perineum is your vagina and anus.   DISCHARGE INSTRUCTIONS:   Care for your perineum:  Healthcare providers will give you a small squirt  bottle and show you how to use it. Do the following after you use the toilet and before you put on a new pad:  Remove the soiled pad    Use the squirt bottle to rinse your perineum from front to back while you sit on the toilet     Pat the area dry from front to back with toilet paper or a cotton cloth     Put on a fresh pad    Wash your hands  Decrease pain:  Ask your healthcare provider about these and other ways to decrease perineal pain:  Sitz baths:  Healthcare providers may give you a portable sitz bath. This is a small tub that fits in the toilet. Fill the sitz bath or bathtub with 4 to 6 inches of warm water. Sit in the warm water for 20 minutes 2 to 3 times a day.    Ice:  Ice helps decrease swelling and pain. Ice may also help prevent tissue damage. Use an ice pack, or put crushed ice in a plastic bag. Cover it with a towel and place it on your perineum for 15 to 20 minutes every hour, or as directed.    Medicine spray, wipes, or pads:  Healthcare providers may give you a medicine spray or wipes soaked with numbing medicine to decrease the pain. Pads that contain an herb called witch hazel may also help reduce pain. Use these after perineal care or a sitz bath.  Follow up with your healthcare provider as directed:  Write down your questions so you remember to ask them during your visits.   Contact your healthcare provider if:   You have heavy vaginal bleeding that fills 1 or more sanitary pads in 1 hour.    You have foul-smelling vaginal discharge.    You feel weak or lightheaded.    You have questions or concerns about your condition or care.  Seek care immediately or call 911 if:   You have large blood clots or bright red blood coming from your vagina.    You have abdominal pain, vomiting, and a fever.  © 2017 [employer] Information is for End User's use only and may not be sold, redistributed or otherwise used for commercial purposes. All illustrations and images included in CareNotes®  are the copyrighted property of [employer]. or [employer].  The above information is an  only. It is not intended as medical advice for individual conditions or treatments. Talk to your doctor, nurse or pharmacist before following any medical regimen to see if it is safe and effective for you.      Postpartum Depression   WHAT YOU NEED TO KNOW:   What is postpartum depression?  Postpartum depression is a mood disorder that occurs after giving birth. A mood is an emotion or a feeling. Moods affect your behavior and how you feel about yourself and life in general. Depression is a sad mood that you cannot control. Women often feel sad, afraid, or nervous after their baby is born. These feelings are called postpartum blues or baby blues, and they usually go away in 1 to 2 weeks. With postpartum depression, these symptoms get worse and continue for more than 2 weeks. Postpartum depression is a serious condition that affects your daily activities and relationships.   What causes postpartum depression?  Healthcare providers do not know exactly what causes postpartum depression. It may be caused by a sudden drop in hormone levels after childbirth. A previous episode of postpartum depression or a family history of depression may increase your risk. Several things may trigger postpartum depression:  Lack of support from the baby's father or other family members    Feeling more tired than usual    Stress, a poor diet, or lack of sleep    Pain after childbirth or pain during breastfeeding    Sudden change in lifestyle  How is postpartum depression diagnosed?  Postpartum depression affects your daily activities and your relationships with other people. Healthcare providers will ask you questions about your signs and symptoms and how they are affecting your life. The symptoms of postpartum depression usually begin within 1 month after childbirth. You feel depressed or lose interest in activities you  enjoy nearly every day for at least 2 weeks. You also have 4 or more of the following symptoms:  You feel tired or have less energy than usual.     You feel unimportant or guilty most of the time.    You think about hurting or killing yourself.    Your appetite changes. You may lose your appetite and lose weight without trying. Your appetite may also increase and you may gain weight.    You are restless, irritable, or withdrawn.    You have trouble concentrating and remembering things. You have trouble doing daily tasks or making decisions.    You have trouble sleeping, even after the baby is asleep.  How is postpartum depression treated?   Psychotherapy:  During therapy, you will talk with healthcare providers about how to cope with your feelings and moods. This can be done alone or in a group. It may also be done with family members or your partner.     Antidepressants:  This medicine is given to decrease or stop the symptoms of depression. You usually need to take antidepressants for several weeks before you begin to feel better. Do not stop taking antidepressants unless your healthcare provider tells you to. Healthcare providers may try a different antidepressant if one type does not work.  What can I do to feel better?   Rest:  Do not try to do everything all at the same time. Do only what is needed and let other things wait until later. Ask your family or friends for help, especially if you have other children. Ask your partner to help with night feedings or other baby care. Try to sleep when the baby naps.     Get emotional support:  Share your feelings with your partner, a friend, or another mother.     Take care of yourself:  Shower and dress each day. Do not skip meals. Try to get out of the house a little each day. Get regular exercise. Eat a healthy diet. Avoid alcohol because it can make your depression worse. Do not isolate yourself. Go for a walk or meet with a friend. It is also important that you  have some time by yourself each day.  How do I find support and more information?   [employer] [place] [facility] ([facility]), Public Information & Communication Branch  [address], [address], [address]  [place] [address]   Phone: 8- 328 - [phone]  Phone: 4- 570 - [phone]  Web Address: http://[email or web address]/  When should I contact my healthcare provider?   You cannot make it to your next visit.    Your depression does not get better with treatment or it gets worse.     You have questions or concerns about your condition or care.  When should I seek immediate care or call 911?   You think about hurting or killing yourself, your baby, or someone else.    You feel like other people want to hurt you.     You hear voices telling you to hurt yourself or your baby.  CARE AGREEMENT:   You have the right to help plan your care. Learn about your health condition and how it may be treated. Discuss treatment options with your caregivers to decide what care you want to receive. You always have the right to refuse treatment. The above information is an  only. It is not intended as medical advice for individual conditions or treatments. Talk to your doctor, nurse or pharmacist before following any medical regimen to see if it is safe and effective for you.  © 2017 [employer] Information is for End User's use only and may not be sold, redistributed or otherwise used for commercial purposes. All illustrations and images included in CareNotes® are the copyrighted property of [employer] Inc. or [employer].      Postpartum Bleeding   WHAT YOU NEED TO KNOW:   Postpartum bleeding is vaginal bleeding after childbirth. This bleeding is normal, whether your baby was born vaginally or by . It contains blood and the tissue that lined the inside of your uterus when you were pregnant.   DISCHARGE INSTRUCTIONS:   What to expect with postpartum bleeding:  Postpartum  bleeding usually lasts at least 10 days, and may last longer than 6 weeks. Your bleeding may range from light (barely staining a pad) to heavy (soaking a pad in 1 hour). Usually, you have heavier bleeding right after childbirth, which slows over the next few weeks until it stops. The bleeding is red or dark brown with clots for the first 1 to 3 days. It then turns pink for several days, and then becomes a white or yellow discharge until it ends.  Follow up with your obstetrician as directed:  Do not have sex until your obstetrician says it is okay. Write down your questions so you remember to ask them during your visits.  Contact your healthcare provider or obstetrician if:   Your bleeding increases, or you have heavy bleeding that soaks a pad in 1 hour for 2 hours in a row.    You pass large blood clots.    You are breathing faster than normal, or your heart is beating faster than normal.    You are urinating less than usual, or not at all.    You feel dizzy.    You have questions or concerns about your condition or care.  Seek immediate care or call 911 if:   You are suddenly short of breath and feel lightheaded.    You have sudden chest pain.  © 2017 [employer] Information is for End User's use only and may not be sold, redistributed or otherwise used for commercial purposes. All illustrations and images included in CareNotes® are the copyrighted property of [employer] or [employer].  The above information is an  only. It is not intended as medical advice for individual conditions or treatments. Talk to your doctor, nurse or pharmacist before following any medical regimen to see if it is safe and effective for you.      Breast Care for the Breast Feeding Mother   WHAT YOU SHOULD KNOW:   Your breasts will go through normal changes while you are breastfeeding. Sometimes breast and nipple problems can develop while you are breastfeeding. Learn about changes that are  normal and those that may be a problem. Breast care can help you prevent and manage problems so you and your baby can enjoy the benefits of breastfeeding.  AFTER YOU LEAVE:   Breast changes while you are breastfeeding:   For the first few days after your baby is born, your body makes a small amount of breast milk (colostrum). Within about 2 to 5 days, your body will begin making mature milk. It may take up to 10 days or longer for mature milk to come in. When your mature milk comes in, your breasts will become full and firm. They may feel tender.     Breastfeeding your baby will decrease the full feeling in your breasts. You may feel a tingly sensation during feedings as milk is released from your breasts. This is called the milk let-down reflex. After 7 or more days, the fullness may feel like it has decreased. Your nipples should look the same as they did before you started breastfeeding. Breasts that feel full before and empty after breastfeeding are signs that breastfeeding is going well.  Breast problems that can occur while you are breastfeeding:   Nipple soreness  may occur when you begin to breastfeed your baby. You may also have nipple soreness if your baby is not latched on to your breast correctly. Correct positioning and latch-on may decrease or stop the pain in your nipples. Work with your caregivers to help your baby latch on correctly. It may also be helpful to place warm, wet compresses on your nipples to help decrease pain.     Plugged milk ducts  may cause painful breast lumps. Plugged ducts may be caused by not emptying your breasts completely during feedings. When your baby pauses during breastfeeding, massage and gently squeeze your breast. Gentle massage may unplug a blocked milk duct. Pump out any milk left in your breasts after your baby is done breastfeeding. Avoid wearing tight tops, tight bras, or under-wire bras, because they may put pressure on your breasts.    Engorgement  may occur as  your milk comes in soon after you begin breastfeeding. Engorgement may cause your breasts to become swollen and painful. Your breasts may also become engorged if you miss a feeding or you do not breastfeed on demand. The best way to decrease engorgement symptoms is to empty your breasts by feeding your baby often. Engorgement can make it hard for your baby to latch on to your breast. If this happens, express a small amount of milk and then have your baby latch on. Cold compresses, gel packs, or ice packs on your breasts can help decrease pain and swelling. Ask your caregiver how often and how long you should use cold, or ice packs.     A breast infection called mastitis  can develop if you have plugged milk ducts or engorgement. Mastitis causes your breasts to become red, swollen, and painful. You may also have flu-like symptoms, such as chills and a fever. Place heat on your breasts to help decrease the pain. You may want to place a moist, warm cloth on the painful breast or both of your breasts. Ask how often to do this. Your primary healthcare provider (PHP) may suggest that you take an NSAID, such as ibuprofen, to decrease pain and swelling. He may also order antibiotics to treat mastitis. Ask about feeding your baby when you have a breast infection.  How to help prevent or manage breast problems while you are breastfeeding:   Learn how to position your baby and latch him on correctly.  To latch your baby correctly to your breast, make sure that his mouth covers most of your areola (dark area around your nipple). He should not be attached only to the nipple. Your baby is latched on well if you feel comfortable and do not feel pain. A correct latch helps him get enough milk and can help to prevent sore nipples and other breast problems. There are several breastfeeding positions that you can try. Find the position that works best for you and your baby. Ask your caregiver for more information about how to hold and  breastfeed your baby.     Prevent biting.  Your baby may get teeth at about 3 to 4 months of age. To help prevent biting, break his suction once he is finished or if he has fallen asleep. To break his suction, slip a finger into the side of his mouth. If your baby bites you, respond with surprise or unhappiness. Offer praise when he does not bite you.     Breastfeed your baby regularly.  Feed your baby 8 to 12 times a day. You may need to wake up your baby at night to feed him. It is okay to feed from 1 or both breasts at each feeding. Your baby should breastfeed from both breasts equally over the course of a day. If your baby only feeds from 1 side during a feeding, offer your other breast to him first for the next feeding.     Schedule and keep follow-up visits.  Talk to your baby's pediatrician or your PHP during follow-up visits if you have breast problems. Caregivers may suggest that you, or you and your partner, attend classes on breastfeeding. You also may want to join a breastfeeding support group. Caregivers may suggest that you see a lactation consultant. This is a caregiver who can help you with breastfeeding.  Contact your PHP if:   You have a fever and chills.    You have body aches and you feel like you do not have any energy.    One or both of your breasts is red, swollen or hard, painful, and feels warm or hot.    You have breast engorgement that does not get better within 24 hours.     You see or feel a lump in your breast that hurts when you touch it.    You have nipple pain during breastfeeding or between feedings.     Your nipples are red, dry, cracked, or bleeding, or they have scabs on them.     You have questions or concerns about your condition or care.  © 2014 [employer] Information is for End User's use only and may not be sold, redistributed or otherwise used for commercial purposes. All illustrations and images included in CareNotes® are the copyrighted property of  [employer]. or [employer].  The above information is an  only. It is not intended as medical advice for individual conditions or treatments. Talk to your doctor, nurse or pharmacist before following any medical regimen to see if it is safe and effective for you.

## 2024-06-23 NOTE — H&P
H & P- Obstetrics   Lisa Smiley 34 y.o. female MRN: [id number]  Unit/Bed#: -01 Encounter: [id number]    Assessment: 34 y.o.  at 39w4d admitted for SROm vs PROM.  SVE: 1.580/-2  FHT: cat1  Clinical EFW: 7lbs; Cephalic confirmed by TAUS  GBS status: negative     Plan:   Elevated BP without diagnosis of hypertension  Assessment & Plan  First elevated BP of this pregnancy in triage on admission   Asymptomatic at this time  Cycle BP  F/u admission CBC, CMP  Obtain urine PC off [place] if patient gets epidural to avoid contamination of amniotic fluid    Rh negative state in antepartum period  Assessment & Plan  Rhogam panel postpartum    39 weeks gestation of pregnancy  Assessment & Plan  PNL wnl  O negative  GBS negative    * Leakage of amniotic fluid  Assessment & Plan  Admitted for SROM vs PROM; SVE 1.5/-2  Admission labs - CBC, T&S, syphilis screen  Clear liquid diet  Anesthesia consult placed   Rh-, Rhogam panel indicated postpartum  GBS-, prophylaxis not indicated  Proceed with expectant management, consider pitocin titration if does not make change        Discussed case and plan w/ Dr. Dowling.      Chief Complaint: leaking fluid    HPI: Lisa Smiley is a 34 y.o.  with an CALLY of 2024, by Last Menstrual Period at 39w4d who is being admitted for spontaneous rupture of membranes. She complains of uterine contractions, occurring every 10 minutes, has moderate LOF, and reports no VB. She states she has felt good FM.    Patient Active Problem List   Diagnosis    39 weeks gestation of pregnancy    Rh negative state in antepartum period    Rh negative state in antepartum period, third trimester    Leakage of amniotic fluid    Elevated BP without diagnosis of hypertension       Baby complications/comments: none    Review of Systems   Constitutional:  Negative for chills and fever.   Respiratory:  Negative for cough and shortness of breath.    Gastrointestinal:  Negative for diarrhea, nausea and vomiting.  "  Genitourinary:  Negative for dysuria and hematuria.   Skin:  Negative for color change and rash.   Neurological:  Negative for dizziness, syncope and headaches.       OB Hx:  OB History    Para Term  AB Living   1             SAB IAB Ectopic Multiple Live Births                  # Outcome Date GA Lbr Jorden/2nd Weight Sex Type Anes PTL Lv   1 Current               Obstetric Comments   Menarche 15        Past Medical Hx:  Past Medical History:   Diagnosis Date    No pertinent past medical history     Varicella     had chicken pox       Past Surgical hx:  Past Surgical History:   Procedure Laterality Date    WISDOM TOOTH EXTRACTION         Social Hx:  Alcohol use: denies  Tobacco use: denies  Other substance use: denies    No Known Allergies    No medications prior to admission.    Objective:  Temp:  [98.4 °F (36.9 °C)] 98.4 °F (36.9 °C)  HR:  [77] 77  Resp:  [16] 16  BP: (130)/(92) 130/92  Body mass index is 28.29 kg/m².     Physical Exam:  Physical Exam  Constitutional:       Appearance: Normal appearance.   Genitourinary:      Vulva normal.   Cardiovascular:      Rate and Rhythm: Normal rate.   Pulmonary:      Effort: Pulmonary effort is normal.   Abdominal:      Palpations: Abdomen is soft.   Neurological:      General: No focal deficit present.      Mental Status: She is alert and oriented to person, place, and time.   Skin:     General: Skin is dry.   Psychiatric:         Mood and Affect: Mood normal.        FHT:  Baseline Rate (FHR): 140 bpm  Variability: Moderate  Accelerations: 15 x 15 or greater  Decelerations: None  FHR Category: Category I    TOCO:   Contraction Frequency (minutes): 2-4  Contraction Duration (seconds):   Contraction Intensity: Mild    Lab Results   Component Value Date    WBC 11.30 (H) 2024    HGB 11.1 (L) 2024    HCT 34.8 2024     2024     No results found for: \"NA\", \"K\", \"CL\", \"CO2\", \"BUN\", \"CREATININE\", \"GLUCOSE\", \"AST\", " "\"ALT\"  Prenatal Labs: Reviewed      Blood type: O negative   Antibody: negative  GBS: negative  HIV: non-reactive  Rubella: immune  Syphilis IgM/IgG: non-reactive  HBsAg: non-reactive  HCAb: non-reactive  Chlamydia: negative  Gonorrhea: negative  Diabetes 1 hour screen: passed  3 hour glucose: n/a  Platelets: 217, pending admission CBC  Hgb: 11.1, pending admission CBC  >2 Midnights  INPATIENT     Signature/Title: Lila Snow MD  Date: 6/23/2024  Time: 5:00 AM    "

## 2024-06-23 NOTE — OB LABOR/OXYTOCIN SAFETY PROGRESS
Labor Progress Note - Lisa Smiley 34 y.o. female MRN: [id number]    Unit/Bed#: -01 Encounter: [id number]       Contraction Frequency (minutes): 2-5  Contraction Intensity: Moderate  Uterine Activity Characteristics: Irregular  Cervical Dilation: Lip/rim (Comment)        Cervical Effacement: 100  Fetal Station: 0  Baseline Rate (FHR): 140 bpm  Fetal Heart Rate (FHT): 135 BPM  FHR Category: 1               Vital Signs:   Vitals:    06/23/24 1415   BP: 118/71   Pulse:    Resp:    Temp:    SpO2:        Notes/comments:   Comfortable with epidural  Continue with expectant managment     Susanne Dowling MD 6/23/2024 3:32 PM

## 2024-06-23 NOTE — L&D DELIVERY NOTE
Vaginal Delivery Summary - OB/GYN   Lisa Smiley 34 y.o. female MRN: [id number]  Unit/Bed#: -01 Encounter: [id number]          Predelivery Diagnosis:  1. Pregnancy at 39.4 wks  2. Labor    Postdelivery Diagnosis:  1. Same as above  2. Delivery of term     Procedure: normal spontaneous vaginal delivery    Attending: Nitesh    Resident: Crispin    Anesthesia: Epidural    QBL: 432cc    Complications: none apparent    Specimens: cord blood, arterial and venous cord blood gasses, placenta (to stoarge), segment of cord    Findings:   1. Viable male at 1834, with APGARS of 9 and 9 at 1 and 5 minutes respectively, Weight not available at time of dictation  2. Placenta - manual removal at 1902  3. 2 degree laceration repaired with 3.0 vicryl  4. Arterial Blood gas 7.258, BE -6.2  5. Venous Blood gas 7.328. BE -5.0    Disposition:  Patient tolerated the procedure well and was recovering in labor and delivery room     Brief history and labor course:  Ms. Lisa Smiley is a 34 y.o.  at 39.4wks presented to labor and delivery with SROM at 0200.  She was initially 1-2/80/-2.  She was tom and uncomfortable and received an epidural.  She progressed throughout the day and was complete at 1752 and started pushing at 1755    Description of procedure    After pushing for 39 minutes, at 1834 patient delivered a viable male , apgars of 9 (1 min) and 9 (5 min). The fetal vertex delivered spontaneously. There was no evidence for nuchal cord. The anterior should delivered atraumatically with maternal expulsive forces and the assistance of downward traction. The posterior shoulder delivered with maternal expulsive forces and the assistance of upward traction. The remainder of the fetus delivered spontaneously.     Upon delivery, the infant was placed on the mothers abdomen and the cord was clamped and cut. Awaiting nurse resuscitators evaluated the . Arterial and venous cord blood gases and cord blood was  collected for analysis. These were promptly sent to the lab. In the immediate post-partum, 30 units of IV pitocin was administered, and the uterus was noted to contract down well with massage and pitocin. The placenta delivered by manual extraction at 1902 and was noted to have a centrally inserted 3 vessel cord. The uterus was massaged until firm and the lower uterine segment was cleared of all clot and debris.     The vagina, cervix, perineum, and rectum were inspected and there was noted to be a 2nd degree laceration that was repaired in the standard 3 layer fashion.    At the conclusion of the procedure, all needle, sponge, and instrument counts were noted to be correct.  Patient tolerated the procedure well. I was present and participated in all key portions of the case.

## 2024-06-23 NOTE — OB LABOR/OXYTOCIN SAFETY PROGRESS
Labor Progress Note - Lisa Smiley 34 y.o. female MRN: [id number]    Unit/Bed#: -01 Encounter: [id number]       Contraction Frequency (minutes): 3-4  Contraction Intensity: Mild/Moderate  Uterine Activity Characteristics: Irregular  Cervical Dilation: 5        Cervical Effacement: 90  Fetal Station: 0  Baseline Rate (FHR): 140 bpm  Fetal Heart Rate (FHT): 135 BPM  FHR Category: 1               Vital Signs:   Vitals:    06/23/24 1145   BP: 126/74   Pulse:    Resp:    Temp:    SpO2:        Notes/comments:   Comfortable with epidural  Continue expectant managment     Susanne Dowling MD 6/23/2024 12:24 PM

## 2024-06-23 NOTE — OB LABOR/OXYTOCIN SAFETY PROGRESS
Labor Progress Note - Lisa Smiley 34 y.o. female MRN: [id number]    Unit/Bed#: -01 Encounter: [id number]       Contraction Frequency (minutes): 1.5-5.5  Contraction Intensity: Mild  Uterine Activity Characteristics: Irregular  Cervical Dilation: 1-2        Cervical Effacement: 80  Fetal Station: -2  Baseline Rate (FHR): 145 bpm  Fetal Heart Rate (FHT): 145 BPM  FHR Category: I               Vital Signs:   Vitals:    06/23/24 0730   BP: 140/83   Pulse:    Resp:    Temp:        Notes/comments: patient desires epidural prior to SVE. FHT reactive prior to this time. Will perform SVE when comfortable.       Susi Tobar MD 6/23/2024 7:52 AM

## 2024-06-23 NOTE — ASSESSMENT & PLAN NOTE
Admitted for SROM vs PROM; SVE 1.5/80/-2  Admission labs - CBC, T&S, syphilis screen  Clear liquid diet  Anesthesia consult placed   Rh-, Rhogam panel indicated postpartum  GBS-, prophylaxis not indicated  Proceed with expectant management, consider pitocin titration if does not make change

## 2024-06-23 NOTE — OB LABOR/OXYTOCIN SAFETY PROGRESS
Labor Progress Note - Lisa Smiley 34 y.o. female MRN: [id number]    Unit/Bed#: -01 Encounter: [id number]       Contraction Frequency (minutes): 2-4  Contraction Intensity: Moderate/Strong  Uterine Activity Characteristics: Irregular  Cervical Dilation: 10  Dilation Complete Date: 06/23/24  Dilation Complete Time: 1752  Cervical Effacement: 100  Fetal Station: 1  Baseline Rate (FHR): 135 bpm  Fetal Heart Rate (FHT): 135 BPM  FHR Category: 1               Vital Signs:   Vitals:    06/23/24 1745   BP: 138/89   Pulse: 92   Resp:    Temp:    SpO2:        Notes/comments:   Feeling some pressure  Will start pushing     Susanne Dowling MD 6/23/2024 5:52 PM

## 2024-06-23 NOTE — OB LABOR/OXYTOCIN SAFETY PROGRESS
Labor Progress Note - Lisa Smiley 34 y.o. female MRN: [id number]    Unit/Bed#: -01 Encounter: [id number]       Contraction Frequency (minutes): 2-5  Contraction Intensity: Moderate  Uterine Activity Characteristics: Irregular  Cervical Dilation: 5        Cervical Effacement: 90  Fetal Station: 0  Baseline Rate (FHR): 140 bpm  Fetal Heart Rate (FHT): 135 BPM  FHR Category: I               Vital Signs:   Vitals:    06/23/24 1415   BP: 118/71   Pulse:    Resp:    Temp:    SpO2:        Notes/comments:   FHT Cat I. SVE deferred. Continue to monitor.       Susi Tobar MD 6/23/2024 2:42 PM

## 2024-06-23 NOTE — OB LABOR/OXYTOCIN SAFETY PROGRESS
Labor Progress Note - Lisa Smiley 34 y.o. female MRN: [id number]    Unit/Bed#: -01 Encounter: [id number]       Contraction Frequency (minutes): (P) pt lying on side  Contraction Intensity: (P) Mild/Moderate  Uterine Activity Characteristics: (P) Irregular  Cervical Dilation: 3        Cervical Effacement: 90  Fetal Station: -1  Baseline Rate (FHR): (P) 135 bpm  Fetal Heart Rate (FHT): 135 BPM  FHR Category: II               Vital Signs:   Vitals:    06/23/24 1145   BP: 126/74   Pulse:    Resp:    Temp:    SpO2:        Notes/comments:     FHT cat II with baseline 150bpm, moderate variability, 15x15 accelerations, and occasional late decelerations. SVE deferred. Will recheck in 2 hrs.    Susi Tobar MD 6/23/2024 12:07 PM

## 2024-06-23 NOTE — ANESTHESIA PREPROCEDURE EVALUATION
Procedure:  LABOR ANALGESIA    Relevant Problems   GYN   (+) 39 weeks gestation of pregnancy        Physical Exam    Airway    Mallampati score: II  TM Distance: >3 FB  Neck ROM: full     Dental   No notable dental hx     Cardiovascular      Pulmonary      Other Findings  post-pubertal.      Anesthesia Plan  ASA Score- 2     Anesthesia Type- epidural with ASA Monitors.         Additional Monitors:     Airway Plan:     Comment: Patient examined, history reviewed.  Labor epidural explained, risks and benefits discussed.  Consent has been signed..       Plan Factors-Exercise tolerance (METS): >4 METS.    Chart reviewed.   Existing labs reviewed. Patient summary reviewed.                  Induction-     Postoperative Plan-     Perioperative Resuscitation Plan - Level 1 - Full Code.       Informed Consent- Anesthetic plan and risks discussed with patient.  I personally reviewed this patient with the CRNA. Discussed and agreed on the Anesthesia Plan with the CRNA..

## 2024-06-24 PROBLEM — O42.90 LEAKAGE OF AMNIOTIC FLUID: Status: RESOLVED | Noted: 2024-06-23 | Resolved: 2024-06-24

## 2024-06-24 PROBLEM — O13.9 GESTATIONAL HYPERTENSION: Status: ACTIVE | Noted: 2024-06-23

## 2024-06-24 LAB — TREPONEMA PALLIDUM IGG+IGM AB [PRESENCE] IN SERUM OR PLASMA BY IMMUNOASSAY: NORMAL

## 2024-06-24 PROCEDURE — 99024 POSTOP FOLLOW-UP VISIT: CPT | Performed by: OBSTETRICS & GYNECOLOGY

## 2024-06-24 RX ORDER — ACETAMINOPHEN 325 MG/1
650 TABLET ORAL EVERY 4 HOURS PRN
Start: 2024-06-24

## 2024-06-24 RX ORDER — IBUPROFEN 600 MG/1
600 TABLET ORAL EVERY 6 HOURS PRN
Qty: 60 TABLET | Refills: 0 | Status: SHIPPED | OUTPATIENT
Start: 2024-06-24

## 2024-06-24 RX ORDER — IBUPROFEN 600 MG/1
600 TABLET ORAL EVERY 6 HOURS
Start: 2024-06-24 | End: 2024-06-24

## 2024-06-24 RX ADMIN — DOCUSATE SODIUM 100 MG: 100 CAPSULE, LIQUID FILLED ORAL at 08:58

## 2024-06-24 RX ADMIN — IBUPROFEN 600 MG: 600 TABLET, FILM COATED ORAL at 01:25

## 2024-06-24 RX ADMIN — IBUPROFEN 600 MG: 600 TABLET, FILM COATED ORAL at 08:57

## 2024-06-24 RX ADMIN — DOCUSATE SODIUM 100 MG: 100 CAPSULE, LIQUID FILLED ORAL at 17:04

## 2024-06-24 NOTE — ANESTHESIA POSTPROCEDURE EVALUATION
Post-Op Assessment Note    CV Status:  Stable  Pain Score: 0    Pain management: adequate      Post-op block assessment: site cleaned, no complications and catheter intact   Mental Status:  Alert and awake   Hydration Status:  Euvolemic   PONV Controlled:  Controlled   Airway Patency:  Patent     Post Op Vitals Reviewed: Yes    No anethesia notable event occurred.    Staff: CRNA   Comments: vss maex4 no deficits              /79 (06/23/24 2001)    Temp      Pulse 98 (06/23/24 2001)   Resp      SpO2

## 2024-06-24 NOTE — LACTATION NOTE
This note was copied from a baby's chart.  CONSULT - LACTATION  Baby Boy (Marty Smiley 1 days male MRN: [id number]    [facility] NURS[facility] Room / Bed: (N)/(N) Encounter: [id number]    Maternal Information     MOTHER:  Lisa Smiley  Maternal Age: 34 y.o.  OB History: # 1 - Date: 24, Sex: Male, Weight: 2935 g (6 lb 7.5 oz), GA: 39w4d, Type: Vaginal, Spontaneous, Apgar1: 9, Apgar5: 9, Living: Living, Birth Comments: None   Previouse breast reduction surgery? No    Lactation history:   Has patient previously breast fed:     How long had patient previously breast fed:     Previous breast feeding complications:       Past Surgical History:   Procedure Laterality Date    WISDOM TOOTH EXTRACTION         Birth information:  YOB: 2022   Time of birth: 6:34 PM   Sex: male   Delivery type: Vaginal, Spontaneous   Birth Weight: 2935 g (6 lb 7.5 oz)   Percent of Weight Change: 0%     Gestational Age: 39w4d   [unfilled]    Assessment     Breast and nipple assessment: normal assessment     Assessment: normal assessment    Feeding assessment: feeding well  LATCH:  Latch: Grasps breast, tongue down, lips flanged, rhythmic sucking   Audible Swallowing: Spontaneous and intermittent (24 hours old)   Type of Nipple: Everted (After stimulation)   Comfort (Breast/Nipple): Soft/non-tender   Hold (Positioning): Full assist, staff holds infant at breast   LATCH Score: 8           24 1200   Lactation Consultation   Reason for Consult 10;20 min   LATCH Documentation   Latch 2   Audible Swallowing 2   Type of Nipple 2   Comfort (Breast/Nipple) 2   Hold (Positioning) 0   LATCH Score 8   Having latch problems? No   Position(s) Used Cross Cradle   Breasts/Nipples   Left Breast Soft   Right Breast Soft   Left Nipple Everted   Right Nipple Everted   Intervention Hand expression   Breastfeeding Progress Not yet established;Breastfeeding well   Breast Pump   Pump 3  (has  insurance provided breast pumps)   Patient Follow-Up   Lactation Consult Status 2   Follow-Up Type Inpatient;Call as needed   Other OB Lactation Documentation    Additional Problem Noted Ki is feeding well at the breast after adjustments with positioning including placing baby skin to skin. family plans on feeding formula for mixed feeding as well. Reviewed AAP guidelines.  (RSB reviewed. D/C booklet at bedside.)     Feeding recommendations:  breast feed on demand    Information on hand expression given. Discussed benefits of knowing how to manually express breast including stimulating milk supply, softening nipple for latch and evacuating breast in the event of engorgement.    Reviewed how to bring baby to the breast so that his lower lip and chin touch the breast with his nose just above the nipple to encourage a wider, more asymmetric latch.    Met with mother. Provided mother with Ready, Set, Baby booklet which contained information on:  Hand expression with access to QR codes to review hand expression.  Positioning and latch reviewed as well as showing images of other feeding positions.  Discussed the properties of a good latch in any position.   Feeding on cue and what that means for recognizing infant's hunger, s/s that baby is getting enough milk and some s/s that breastfeeding dyad may need further help  Skin to Skin contact and benefits to mom and baby  Avoidance of pacifiers for the first month discussed.   Gave information on common concerns, what to expect the first few weeks after delivery, preparing for other caregivers, and how partners can help. Resources for support also provided.    Encouraged parents to call for assistance, questions, and concerns about breastfeeding.  Extension provided.      Mai Mccormick RN 6/24/2024 12:22 PM

## 2024-06-24 NOTE — PLAN OF CARE
Problem: POSTPARTUM  Goal: Experiences normal postpartum course  Description: INTERVENTIONS:  - Monitor maternal vital signs  - Assess uterine involution and lochia  Outcome: Progressing  Goal: Appropriate maternal -  bonding  Description: INTERVENTIONS:  - Identify family support  - Assess for appropriate maternal/infant bonding   -Encourage maternal/infant bonding opportunities  - Referral to  or  as needed  Outcome: Progressing  Goal: Establishment of infant feeding pattern  Description: INTERVENTIONS:  - Assess breast/bottle feeding  - Refer to lactation as needed  Outcome: Progressing  Goal: Incision(s), wounds(s) or drain site(s) healing without S/S of infection  Description: INTERVENTIONS  - Assess and document dressing, incision, wound bed, drain sites and surrounding tissue  - Provide patient and family education  Outcome: Progressing     Problem: ALTERATION IN THE BREAST  Goal: Optimize infant feeding at the breast  Description: INTERVENTIONS:  - Latch, breast and nipple assessment  - Assess prior breast feeding history  - Hand expression of breast milk  - For cracked, bleeding and or sore nipples reassess latch, treat damaged nipple  -Educate mother on feeding cues  -Positioning/latch techniques  Outcome: Progressing     Problem: INADEQUATE LATCH, SUCK OR SWALLOW  Goal: Demonstrate ability to latch and sustain latch, audible swallowing and satiety  Description: INTERVENTIONS:  - Assess oral anatomy, notify Physician/AP for abnormal findings  - Establish milk expression  - Maximize feeding opportunity (skin to skin, behavioral state)  - Position/latch techniques  - Discourage use of pacifier-artificial nipple  - Mechanical pumping  - Nipple Shield  - Supplemental formula feeding (Physician/AP order)  - Alternative feeding method  Outcome: Progressing     Problem: PAIN - ADULT  Goal: Verbalizes/displays adequate comfort level or baseline comfort level  Description:  Interventions:  - Encourage patient to monitor pain and request assistance  - Assess pain using appropriate pain scale  - Administer analgesics based on type and severity of pain and evaluate response  - Implement non-pharmacological measures as appropriate and evaluate response  - Consider cultural and social influences on pain and pain management  - Notify physician/advanced practitioner if interventions unsuccessful or patient reports new pain  Outcome: Progressing     Problem: INFECTION - ADULT  Goal: Absence or prevention of progression during hospitalization  Description: INTERVENTIONS:  - Assess and monitor for signs and symptoms of infection  - Monitor lab/diagnostic results  - Monitor all insertion sites, i.e. indwelling lines, tubes, and drains  - Monitor endotracheal if appropriate and nasal secretions for changes in amount and color  - [place] appropriate cooling/warming therapies per order  - Administer medications as ordered  - Instruct and encourage patient and family to use good hand hygiene technique  - Identify and instruct in appropriate isolation precautions for identified infection/condition  Outcome: Progressing  Goal: Absence of fever/infection during neutropenic period  Description: INTERVENTIONS:  - Monitor WBC    Outcome: Progressing     Problem: SAFETY ADULT  Goal: Patient will remain free of falls  Description: INTERVENTIONS:  - Educate patient/family on patient safety including physical limitations  - Instruct patient to call for assistance with activity   - Consult OT/PT to assist with strengthening/mobility   - Keep Call bell within reach  - Keep bed low and locked with side rails adjusted as appropriate  - Keep care items and personal belongings within reach  - Initiate and maintain comfort rounds  - Make Fall Risk Sign visible to staff  - Apply yellow socks and bracelet for high fall risk patients  - Consider moving patient to room near nurses station  Outcome: Progressing  Goal:  Maintain or return to baseline ADL function  Description: INTERVENTIONS:  -  Assess patient's ability to carry out ADLs; assess patient's baseline for ADL function and identify physical deficits which impact ability to perform ADLs (bathing, care of mouth/teeth, toileting, grooming, dressing, etc.)  - Assess/evaluate cause of self-care deficits   - Assess range of motion  - Assess patient's mobility; develop plan if impaired  - Assess patient's need for assistive devices and provide as appropriate  - Encourage maximum independence but intervene and supervise when necessary  - Involve family in performance of ADLs  - Assess for home care needs following discharge   - Consider OT consult to assist with ADL evaluation and planning for discharge  - Provide patient education as appropriate  Outcome: Progressing  Goal: Maintains/Returns to pre admission functional level  Description: INTERVENTIONS:  - Perform AM-PAC 6 Click Basic Mobility/ Daily Activity assessment daily.  - Set and communicate daily mobility goal to care team and patient/family/caregiver.   - Collaborate with rehabilitation services on mobility goals if consulted  - Out of bed for toileting  - Record patient progress and toleration of activity level   Outcome: Progressing     Problem: DISCHARGE PLANNING  Goal: Discharge to home or other facility with appropriate resources  Description: INTERVENTIONS:  - Identify barriers to discharge w/patient and caregiver  - Arrange for needed discharge resources and transportation as appropriate  - Identify discharge learning needs (meds, wound care, etc.)  - Arrange for interpretive services to assist at discharge as needed  - Refer to Case Management Department for coordinating discharge planning if the patient needs post-hospital services based on physician/advanced practitioner order or complex needs related to functional status, cognitive ability, or social support system  Outcome: Progressing     Problem:  Knowledge Deficit  Goal: Patient/family/caregiver demonstrates understanding of disease process, treatment plan, medications, and discharge instructions  Description: Complete learning assessment and assess knowledge base.  Interventions:  - Provide teaching at level of understanding  - Provide teaching via preferred learning methods  Outcome: Progressing

## 2024-06-24 NOTE — PLAN OF CARE
Problem: POSTPARTUM  Goal: Experiences normal postpartum course  Description: INTERVENTIONS:  - Monitor maternal vital signs  - Assess uterine involution and lochia  Outcome: Progressing  Goal: Appropriate maternal -  bonding  Description: INTERVENTIONS:  - Identify family support  - Assess for appropriate maternal/infant bonding   -Encourage maternal/infant bonding opportunities  - Referral to  or  as needed  Outcome: Progressing  Goal: Establishment of infant feeding pattern  Description: INTERVENTIONS:  - Assess breast/bottle feeding  - Refer to lactation as needed  Outcome: Progressing  Goal: Incision(s), wounds(s) or drain site(s) healing without S/S of infection  Description: INTERVENTIONS  - Assess and document dressing, incision, wound bed, drain sites and surrounding tissue  - Provide patient and family education  - Perform skin care/dressing changes every   Outcome: Progressing     Problem: ALTERATION IN THE BREAST  Goal: Optimize infant feeding at the breast  Description: INTERVENTIONS:  - Latch, breast and nipple assessment  - Assess prior breast feeding history  - Hand expression of breast milk  - For cracked, bleeding and or sore nipples reassess latch, treat damaged nipple  -Educate mother on feeding cues  -Positioning/latch techniques  Outcome: Progressing     Problem: INADEQUATE LATCH, SUCK OR SWALLOW  Goal: Demonstrate ability to latch and sustain latch, audible swallowing and satiety  Description: INTERVENTIONS:  - Assess oral anatomy, notify Physician/AP for abnormal findings  - Establish milk expression  - Maximize feeding opportunity (skin to skin, behavioral state)  - Position/latch techniques  - Discourage use of pacifier-artificial nipple  - Mechanical pumping  - Nipple Shield  - Supplemental formula feeding (Physician/AP order)  - Alternative feeding method  Outcome: Progressing     Problem: PAIN - ADULT  Goal: Verbalizes/displays adequate comfort level or  baseline comfort level  Description: Interventions:  - Encourage patient to monitor pain and request assistance  - Assess pain using appropriate pain scale  - Administer analgesics based on type and severity of pain and evaluate response  - Implement non-pharmacological measures as appropriate and evaluate response  - Consider cultural and social influences on pain and pain management  - Notify physician/advanced practitioner if interventions unsuccessful or patient reports new pain  Outcome: Progressing     Problem: INFECTION - ADULT  Goal: Absence or prevention of progression during hospitalization  Description: INTERVENTIONS:  - Assess and monitor for signs and symptoms of infection  - Monitor lab/diagnostic results  - Monitor all insertion sites, i.e. indwelling lines, tubes, and drains  - Monitor endotracheal if appropriate and nasal secretions for changes in amount and color  - [place] appropriate cooling/warming therapies per order  - Administer medications as ordered  - Instruct and encourage patient and family to use good hand hygiene technique  - Identify and instruct in appropriate isolation precautions for identified infection/condition  Outcome: Progressing  Goal: Absence of fever/infection during neutropenic period  Description: INTERVENTIONS:  - Monitor WBC    Outcome: Progressing     Problem: SAFETY ADULT  Goal: Patient will remain free of falls  Description: INTERVENTIONS:  - Educate patient/family on patient safety including physical limitations  - Instruct patient to call for assistance with activity   - Consult OT/PT to assist with strengthening/mobility   - Keep Call bell within reach  - Keep bed low and locked with side rails adjusted as appropriate  - Keep care items and personal belongings within reach  - Initiate and maintain comfort rounds  - Make Fall Risk Sign visible to staff  - Offer Toileting every Hours, in advance of need  - Initiate/Maintain alarm  - Obtain necessary fall risk  management equipment:   - Apply yellow socks and bracelet for high fall risk patients  - Consider moving patient to room near nurses station  Outcome: Progressing  Goal: Maintain or return to baseline ADL function  Description: INTERVENTIONS:  -  Assess patient's ability to carry out ADLs; assess patient's baseline for ADL function and identify physical deficits which impact ability to perform ADLs (bathing, care of mouth/teeth, toileting, grooming, dressing, etc.)  - Assess/evaluate cause of self-care deficits   - Assess range of motion  - Assess patient's mobility; develop plan if impaired  - Assess patient's need for assistive devices and provide as appropriate  - Encourage maximum independence but intervene and supervise when necessary  - Involve family in performance of ADLs  - Assess for home care needs following discharge   - Consider OT consult to assist with ADL evaluation and planning for discharge  - Provide patient education as appropriate  Outcome: Progressing  Goal: Maintains/Returns to pre admission functional level  Description: INTERVENTIONS:  - Perform AM-PAC 6 Click Basic Mobility/ Daily Activity assessment daily.  - Set and communicate daily mobility goal to care team and patient/family/caregiver.   - Collaborate with rehabilitation services on mobility goals if consulted  - Perform Range of Motion  times a day.  - Reposition patient every hours.  - Dangle patient  times a day  - Stand patient  times a day  - Ambulate patient  times a day  - Out of bed to chair  times a day   - Out of bed for meals times a day  - Out of bed for toileting  - Record patient progress and toleration of activity level   Outcome: Progressing     Problem: DISCHARGE PLANNING  Goal: Discharge to home or other facility with appropriate resources  Description: INTERVENTIONS:  - Identify barriers to discharge w/patient and caregiver  - Arrange for needed discharge resources and transportation as appropriate  - Identify  discharge learning needs (meds, wound care, etc.)  - Arrange for interpretive services to assist at discharge as needed  - Refer to Case Management Department for coordinating discharge planning if the patient needs post-hospital services based on physician/advanced practitioner order or complex needs related to functional status, cognitive ability, or social support system  Outcome: Progressing     Problem: Knowledge Deficit  Goal: Patient/family/caregiver demonstrates understanding of disease process, treatment plan, medications, and discharge instructions  Description: Complete learning assessment and assess knowledge base.  Interventions:  - Provide teaching at level of understanding  - Provide teaching via preferred learning methods  Outcome: Progressing

## 2024-06-24 NOTE — ASSESSMENT & PLAN NOTE
Continue routine post partum care  Pain well controlled: tylenol/motrin scheduled  Lochia within normal limits: continue to monitor   OOB: as able, encourage ambulation  Passing flatus  Voiding spontaneously  Encourage breastfeeding  Baby in: room  Dispo: anticipate d/c home PPD1-2

## 2024-06-24 NOTE — PROGRESS NOTES
Obstetrics Progress Note  Lisa Smiley 34 y.o. female MRN: [id number]  Unit/Bed#: -01 Encounter: [id number]    Assessment/Plan:    Postpartum Day #1 s/p , complicated by manual extraction of the placenta. Baby in room. By issue:    Gestational hypertension  Assessment & Plan  First elevated BP of this pregnancy in triage on admission   Asymptomatic at this time  Cycle BP  Admission CBC, CMP wnl  Consider urine PC  Asymptomatic at this time  Systolic (12hrs), Av , Min:91 , Max:138   Diastolic (12hrs), Av, Min:59, Max:86      Rh negative state in antepartum period  Assessment & Plan  Rhogam panel postpartum    *  (spontaneous vaginal delivery)  Assessment & Plan       Continue routine post partum care  Pain well controlled: tylenol/motrin scheduled  Lochia within normal limits: continue to monitor   OOB: as able, encourage ambulation  Passing flatus  Voiding spontaneously  Encourage breastfeeding  Baby in: room  Dispo: anticipate d/c home PPD1-2        Subjective/Objective     Subjective:   No acute events overnight. Pain: controlled. Tolerating PO: yes. Voiding: yes. Flatus: passing. Ambulating: yes. Chest pain: no. Shortness of breath: no. Leg pain: no. Lochia: within normal limits. Baby in room, feeding via breast.    Objective:   Vitals:   Temp:  [97.8 °F (36.6 °C)-100.5 °F (38.1 °C)] 98.4 °F (36.9 °C)  HR:  [] 95  Resp:  [16-18] 16  BP: ()/(55-91) 91/61       Intake/Output Summary (Last 24 hours) at 2024 0644  Last data filed at 2024 0101  Gross per 24 hour   Intake 122.65 ml   Output 2632 ml   Net -2509.35 ml       Lab Results   Component Value Date    WBC 14.19 (H) 2024    HGB 11.3 (L) 2024    HCT 35.3 2024    MCV 87 2024     2024       Physical Exam:   General: alert and oriented x3, in no apparent distress  Cardiovascular: regular rate  Pulmonary: normal effort  Abdomen: Soft, non-tender, non-distended, no rebound or guarding.  Uterine fundus firm and non-tender, -1 cm below the umbilicus  Extremities: Non tender with no edema    Lila Snow MD  PGY-I, OBGYN  6/24/2024, 6:44 AM

## 2024-06-25 VITALS
HEIGHT: 65 IN | HEART RATE: 88 BPM | SYSTOLIC BLOOD PRESSURE: 120 MMHG | BODY MASS INDEX: 28.32 KG/M2 | DIASTOLIC BLOOD PRESSURE: 82 MMHG | OXYGEN SATURATION: 98 % | WEIGHT: 170 LBS | TEMPERATURE: 97.8 F | RESPIRATION RATE: 16 BRPM

## 2024-06-25 PROCEDURE — 99024 POSTOP FOLLOW-UP VISIT: CPT | Performed by: OBSTETRICS & GYNECOLOGY

## 2024-06-25 NOTE — LACTATION NOTE
"This note was copied from a baby's chart.  Discharge Lactation: Mom has baby on the left breast in a cradle hold. Shallow latch noted as cheeks are not touching the breast. Mom complains of nipple sensitivity. Lisa states Ki cluster-fed overnight.     Ed. On positioning and alignment. Enc. To aim the nipple towards the palate, not the mouth. Demonstration and teach back of how to flange the lower lip.     Assistance with flanging baby's lips on the breast. Demonstration and teach-back of tugging on chin to flange lower lip and pressing digit into the breast, rolling digit under the upper lip and away from the oral cavity to flange upper lip while baby is latched. Education on how to achieve flanged lips with initial latch and positioning to the breast. Encouraged to un-latch and re-latch baby if mom has pain, discomfort, or if latch appears shallow. Encouraged to call lactation for assistance.     Education on positioning and alignment. Mom is encouraged to:     - Bring baby up to the breast (use of pillows to elevate so baby's torso is against mom's breasts)   - Skin to skin for feedings with top hand exposed to show signs of satiation   - Chin deep into breast tissue (make baby look up to the nipple)   - nose aligned to the nipple   -Wait for wide gape, drag chin on the breast so nipple is aimed at the upper, back palate  - Cheek should be touching breast   - Deep, firm hold of baby with ear, shoulder, hip alignment    Nurse on demand: when baby gives hunger cues; when your breasts feel full, or at least every 3 hours during the day and every 5 hours at night counting from the beginning of one feeding to the beginning of the next; which ever comes first. When sucking and swallowing slow, gently compress the breast to restart flow. If active suck-swallow does not restart, gently remove the baby and offer the other breast; offering up to \"four\" breasts per feeding.        Deeper latch allows mom to breastfeed " without pain.    Mom wants baby and me appt - sent epic msg

## 2024-06-25 NOTE — PROGRESS NOTES
"Progress Note - OB/GYN  Lisa Smiley 34 y.o. female MRN: [id number]  Unit/Bed#:  320-01 Encounter: [id number]    Assessment and Plan     Lisa Smiley is a patient of: [facility]. She is PPD# 2 s/p . Recovering well and is stable.     *  (spontaneous vaginal delivery)  Assessment & Plan       Continue routine post partum care  Pain well controlled: tylenol/motrin scheduled  Lochia within normal limits: continue to monitor   OOB: as able, encourage ambulation  Passing flatus  Voiding spontaneously  Encourage breastfeeding  Baby in: room  Dispo: anticipate d/c home PPD1-2      Gestational hypertension  Assessment & Plan  First elevated BP of this pregnancy in triage on admission   Asymptomatic at this time  Cycle BP  Admission CBC, CMP wnl  Consider urine PC  Asymptomatic at this time  Systolic (12hrs), Av , Min:91 , Max:138   Diastolic (12hrs), Av, Min:59, Max:86      Rh negative state in antepartum period  Assessment & Plan  Rhogam panel postpartum        Disposition    -  Anticipate discharge home on PPD# 2      Subjective/Objective     Chief Complaint: Postpartum State     Subjective:    Lisa Smiley is PPD#2 s/p . She has no current complaints.  Pain is well controlled with medications.  Patient is currently voiding and ambulating without difficulty.  Patient is currently passing flatus, tolerating PO diet, and denies nausea or vomitting. Patient denies fever, chills, chest pain, shortness of breath, or calf tenderness. Lochia is minimal. She is Breastfeeding and Bottle feeding. She is recovering well and is stable.       Vitals:   /77 (BP Location: Right arm)   Pulse 80   Temp 98 °F (36.7 °C) (Oral)   Resp 18   Ht 5' 5\" (1.651 m)   Wt 77.1 kg (170 lb)   LMP 2023 (Exact Date)   SpO2 99%   Breastfeeding Yes   BMI 28.29 kg/m²     No intake or output data in the 24 hours ending 24 0736    Invasive Devices       None                   Physical Exam:   GEN: Lisa " B[place] appears well, alert and oriented x 3, pleasant and cooperative   CARDIO: RRR, no murmurs or rubs  RESP:  CTAB, no wheezes or rales  ABDOMEN: soft, no tenderness, no distention, fundus @ U-2  EXTREMITIES: SCDs on, non tender, no erythema    Labs:     Hemoglobin   Date Value Ref Range Status   06/23/2024 11.3 (L) 11.5 - 15.4 g/dL Final   03/20/2024 11.1 (L) 11.5 - 15.4 g/dL Final     WBC   Date Value Ref Range Status   06/23/2024 14.19 (H) 4.31 - 10.16 Thousand/uL Final   03/20/2024 11.30 (H) 4.31 - 10.16 Thousand/uL Final     Platelets   Date Value Ref Range Status   06/23/2024 222 149 - 390 Thousands/uL Final   03/20/2024 217 149 - 390 Thousands/uL Final     Creatinine   Date Value Ref Range Status   06/23/2024 0.61 0.60 - 1.30 mg/dL Final     Comment:     Standardized to IDMS reference method     AST   Date Value Ref Range Status   06/23/2024 30 13 - 39 U/L Final     Comment:     Slightly Hemolyzed:Results may be affected.     ALT   Date Value Ref Range Status   06/23/2024 12 7 - 52 U/L Final     Comment:     Specimen collection should occur prior to Sulfasalazine administration due to the potential for falsely depressed results.           Ji Rudolph MD  6/25/2024  7:36 AM

## 2024-06-25 NOTE — UTILIZATION REVIEW
"Mother and baby discharged on 2024       NOTIFICATION OF INPATIENT ADMISSION   MATERNITY/DELIVERY AUTHORIZATION REQUEST   SERVICING FACILITY:   [facility] - L&D, [place], NICU  [address] [place] [address]  Tax ID: [id number]  NPI: [id number]   ATTENDING PROVIDER:  Attending Name and NPI#: Susanne Dowling Md [[id number]]  Address: [address] [place] [address]. [place] [address]  Phone: [phone]   ADMISSION INFORMATION:  Place of Service: Inpatient [facility]  Place of Service Code: 21  Inpatient Admission Date/Time: 24  3:59 AM  Discharge Date/Time: 2024  9:55 AM  Admitting Diagnosis Code/Description:  Encounter for supervision of normal pregnancy, unspecified, unspecified trimester [Z34.90]  Encounter for full-term uncomplicated delivery [O80]     Mother: Lisa Smiley 1990 Estimated Date of Delivery: 24  Delivering clinician: Susanne Dowling   OB History          1    Para   1    Term   1            AB        Living   1         SAB        IAB        Ectopic        Multiple   0    Live Births   1           Obstetric Comments   Menarche 15               Name & MRN:   Information for the patient's :  Ki Smiley [[id number]]   [place] Delivery Information:  Sex: male  Delivered 2024 6:34 PM by Vaginal, Spontaneous; Gestational Age: 39w4d     Measurements:  Weight: 6 lb 7.5 oz (2935 g);  Height: 18\"    APGAR 1 minute 5 minutes 10 minutes   Totals: 9 9       UTILIZATION REVIEW CONTACT:  [employer] Utilization   Network Utilization Review Department  Phone: [phone]  Fax [phone]  Email: [email or web address]  Contact for approvals/pending authorizations, clinical reviews, and discharge.     PHYSICIAN ADVISORY SERVICES:  Medical Necessity Denial & [place] Review  Phone: [phone]  Fax: [phone]  Email: " [email or web address]     DISCHARGE SUPPORT TEAM:  For Patients Discharge Needs & Updates  Phone: [phone] opt. 2 Fax: [phone]  Email: [email or web address]

## 2024-06-25 NOTE — PLAN OF CARE
Problem: POSTPARTUM  Goal: Experiences normal postpartum course  Description: INTERVENTIONS:  - Monitor maternal vital signs  - Assess uterine involution and lochia  Outcome: Adequate for Discharge  Goal: Appropriate maternal -  bonding  Description: INTERVENTIONS:  - Identify family support  - Assess for appropriate maternal/infant bonding   -Encourage maternal/infant bonding opportunities  - Referral to  or  as needed  Outcome: Adequate for Discharge  Goal: Establishment of infant feeding pattern  Description: INTERVENTIONS:  - Assess breast/bottle feeding  - Refer to lactation as needed  Outcome: Adequate for Discharge  Goal: Incision(s), wounds(s) or drain site(s) healing without S/S of infection  Description: INTERVENTIONS  - Assess and document dressing, incision, wound bed, drain sites and surrounding tissue  Outcome: Adequate for Discharge     Problem: ALTERATION IN THE BREAST  Goal: Optimize infant feeding at the breast  Description: INTERVENTIONS:  - Latch, breast and nipple assessment  - Assess prior breast feeding history  - Hand expression of breast milk  - For cracked, bleeding and or sore nipples reassess latch, treat damaged nipple  -Educate mother on feeding cues  -Positioning/latch techniques  Outcome: Adequate for Discharge     Problem: INADEQUATE LATCH, SUCK OR SWALLOW  Goal: Demonstrate ability to latch and sustain latch, audible swallowing and satiety  Description: INTERVENTIONS:  - Assess oral anatomy, notify Physician/AP for abnormal findings  - Establish milk expression  - Maximize feeding opportunity (skin to skin, behavioral state)  - Position/latch techniques  - Discourage use of pacifier-artificial nipple  - Mechanical pumping  - Nipple Shield  - Supplemental formula feeding (Physician/AP order)  - Alternative feeding method  Outcome: Adequate for Discharge     Problem: PAIN - ADULT  Goal: Verbalizes/displays adequate comfort level or baseline comfort  level  Description: Interventions:  - Encourage patient to monitor pain and request assistance  - Assess pain using appropriate pain scale  - Administer analgesics based on type and severity of pain and evaluate response  - Implement non-pharmacological measures as appropriate and evaluate response  - Consider cultural and social influences on pain and pain management  - Notify physician/advanced practitioner if interventions unsuccessful or patient reports new pain  Outcome: Adequate for Discharge     Problem: INFECTION - ADULT  Goal: Absence or prevention of progression during hospitalization  Description: INTERVENTIONS:  - Assess and monitor for signs and symptoms of infection  - Monitor lab/diagnostic results  - Monitor all insertion sites, i.e. indwelling lines, tubes, and drains  - Monitor endotracheal if appropriate and nasal secretions for changes in amount and color  - [place] appropriate cooling/warming therapies per order  - Administer medications as ordered  - Instruct and encourage patient and family to use good hand hygiene technique  - Identify and instruct in appropriate isolation precautions for identified infection/condition  Outcome: Adequate for Discharge  Goal: Absence of fever/infection during neutropenic period  Description: INTERVENTIONS:  - Monitor WBC    Outcome: Adequate for Discharge     Problem: SAFETY ADULT  Goal: Patient will remain free of falls  Description: INTERVENTIONS:  - Educate patient/family on patient safety including physical limitations  - Instruct patient to call for assistance with activity   - Consult OT/PT to assist with strengthening/mobility   - Keep Call bell within reach  - Keep bed low and locked with side rails adjusted as appropriate  - Keep care items and personal belongings within reach  - Initiate and maintain comfort rounds  - Make Fall Risk Sign visible to staff  - Apply yellow socks and bracelet for high fall risk patients  - Consider moving patient to  room near nurses station  Outcome: Adequate for Discharge  Goal: Maintain or return to baseline ADL function  Description: INTERVENTIONS:  -  Assess patient's ability to carry out ADLs; assess patient's baseline for ADL function and identify physical deficits which impact ability to perform ADLs (bathing, care of mouth/teeth, toileting, grooming, dressing, etc.)  - Assess/evaluate cause of self-care deficits   - Assess range of motion  - Assess patient's mobility; develop plan if impaired  - Assess patient's need for assistive devices and provide as appropriate  - Encourage maximum independence but intervene and supervise when necessary  - Involve family in performance of ADLs  - Assess for home care needs following discharge   - Consider OT consult to assist with ADL evaluation and planning for discharge  - Provide patient education as appropriate  Outcome: Adequate for Discharge  Goal: Maintains/Returns to pre admission functional level  Description: INTERVENTIONS:  - Perform AM-PAC 6 Click Basic Mobility/ Daily Activity assessment daily.  - Set and communicate daily mobility goal to care team and patient/family/caregiver.   - Collaborate with rehabilitation services on mobility goals if consulted  - Out of bed for toileting  - Record patient progress and toleration of activity level   Outcome: Adequate for Discharge     Problem: DISCHARGE PLANNING  Goal: Discharge to home or other facility with appropriate resources  Description: INTERVENTIONS:  - Identify barriers to discharge w/patient and caregiver  - Arrange for needed discharge resources and transportation as appropriate  - Identify discharge learning needs (meds, wound care, etc.)  - Arrange for interpretive services to assist at discharge as needed  - Refer to Case Management Department for coordinating discharge planning if the patient needs post-hospital services based on physician/advanced practitioner order or complex needs related to functional  status, cognitive ability, or social support system  Outcome: Adequate for Discharge     Problem: Knowledge Deficit  Goal: Patient/family/caregiver demonstrates understanding of disease process, treatment plan, medications, and discharge instructions  Description: Complete learning assessment and assess knowledge base.  Interventions:  - Provide teaching at level of understanding  - Provide teaching via preferred learning methods  Outcome: Adequate for Discharge

## 2024-06-29 LAB — PLACENTA IN STORAGE: NORMAL

## 2024-07-01 ENCOUNTER — PATIENT MESSAGE (OUTPATIENT)
Dept: OBGYN CLINIC | Facility: CLINIC | Age: 34
End: 2024-07-01

## 2024-07-05 NOTE — PATIENT COMMUNICATION
Placed call to the patient to review a post partum assessment. Left a voicemail for her to contact the office back

## 2024-07-10 ENCOUNTER — TELEPHONE (OUTPATIENT)
Age: 34
End: 2024-07-10

## 2024-07-10 NOTE — TELEPHONE ENCOUNTER
Pt returned call to review postpartum assessment. Attempted warm transfer to office, however, office communicated you were out of office today. Please provide a call back at your earliest convenience. Thank you.

## 2024-07-11 NOTE — PATIENT COMMUNICATION
Spoke with patient~    POSTPARTUM PHONE CALL ASSESSMENT    Date of Delivery: 2024  Delivering Provider:   Mode:   Delivery Notes/Complications: none   Do you still have bleeding/pain? If so, how much/how severe? Minimal bleeding / no pain   Regular BMs/Urination? Yes / yes  Breastfeeding/Formula/Both? breastfeeding  How are you doing emotionally? Feels good   EPDS Score: 8  Do you have any other questions or concerns for us or your provider? None at this time  Have you scheduled the pediatrician appointment with pediatrician? yes  Do you have a postpartum visit scheduled? yes   Date scheduled: 2024 Provider:     Patient is agreeable to contact the office with any concerns.

## 2024-07-19 ENCOUNTER — OFFICE VISIT (OUTPATIENT)
Dept: POSTPARTUM | Facility: CLINIC | Age: 34
End: 2024-07-19
Payer: COMMERCIAL

## 2024-07-19 PROCEDURE — 99404 PREV MED CNSL INDIV APPRX 60: CPT | Performed by: PEDIATRICS

## 2024-07-19 NOTE — PATIENT INSTRUCTIONS
-Continue to feed on demand, watch for signs of effective feeding. You should feel strong, comfortable tugging, hear swallows and feel your breasts become softer after baby is finished.  -Try lymphatic drainage massage of the breast to help with your engorgement. A helpful video can be found here:  https://youtu.be/[email or web address]   -Reverse pressure softening to soften areola before latching attempts:  Engorgement Help: Reverse Pressure Softening  [email or web address]   -Wear a supportive, but not tight, bra. Sit comfortably reclined when nursing or pumping, and throughout the day when possible.   -Okay to pump as needed for uncomfortable engorgement, utilize flange fit and settings that are comfortable for you, pumping should not be painful! Goal to soften the breast to comfort as needed, if baby is also feeding well around the clock try to limit pumping to avoid excess milk removal.  -Your nipples measured today at 15 mm on each side. -Spectra Flange Fit Guide : What's My Breast Size?  Flange Guide  [employer] [facility]    -Avoid haakaa use with each feeding, this can contribute to oversupply and risk associated with it.   -Okay to collect leaking milk, be mindful to avoid any suction or additional pressure on the breast that may promote milk to flow in between feedings.  -Cold compresses, like a bag of peas applied over a thin blanket or towel to the breast, is recommended for breast comfort and decreasing inflammation in the breast.  -Healing techniques for nipples : nipple cream of choice and airflow, or apply cream and cover with a piece of wax or parchment paper over top in between feedings.   - Storing and Thawing Breast Milk  [facility] Breastfeeding Support ([place]da.gov)    -Please call or scheduled a follow up appointment with lactation as needed for questions or concerns you may have.

## 2024-07-19 NOTE — PROGRESS NOTES
INITIAL BREAST FEEDING EVALUATION    Informant/Relationship: Lisa (mom/self)     Discussion of General Lactation Issues: Lisa feels that Ki was nursing well initially but for about one week she is having pain and baby seems to be hungry within an hour after nursing.     Infant is 3 weeks old today.        History:  Fertility Problem:no  Breast changes:yes - enlargement, color changes, tenderness   : yes - membrane sweep, water broke a day and a half later, she was in labor for 16 hours, pushed for 40 minutes. She got an epidural, did not need pitocin.   Full term:yes - 39 and 4    labor:no  First nursing/attempt < 1 hour after birth: latched for the first time 2-3 hours after delivery. This was due to complications with Mom.   Skin to skin following delivery:yes - immediately   Breast changes after delivery:yes - 4-5 days postpartum   Rooming in (infant in room with mother with exception of procedures, eg. Circumcision: yes - entire stay other than circ  Blood sugar issues:no  NICU stay:no  Jaundice:no  Phototherapy:no  Supplement given: (list supplement and method used as well as reason(s):yes - started once latching was painful for Mom.     Past Medical History:   Diagnosis Date    No pertinent past medical history     Varicella     had chicken pox         Current Outpatient Medications:     acetaminophen (TYLENOL) 325 mg tablet, Take 2 tablets (650 mg total) by mouth every 4 (four) hours as needed for mild pain, Disp: , Rfl:     benzocaine-menthol-lanolin-aloe (DERMOPLAST) 20-0.5 % topical spray, Apply 1 Application topically 4 (four) times a day as needed (perineal pain), Disp: , Rfl:     ibuprofen (MOTRIN) 600 mg tablet, Take 1 tablet (600 mg total) by mouth every 6 (six) hours as needed for mild pain, Disp: 60 tablet, Rfl: 0    witch hazel-glycerin (TUCKS) topical pad, Apply 1 Pad topically every 4 (four) hours as needed for irritation, Disp: , Rfl:     No Known Allergies    Social  History     Substance and Sexual Activity   Drug Use Never       Social History     Interval Breastfeeding History:    Frequency of breast feeding: during the day every 1-1.5 hours, overnight every 2.5-3 h   Does mother feel breastfeeding is effective: If no, explain: not all of the feedings, feeding times vary.   Does infant appear satisfied after nursing:If no, explain: immediately after yes, but wakes more often.   Stooling pattern normal: lYes  Urinating frequently:Yes  Using shield or shells: No    Alternative/Artificial Feedings:   Bottle: Yes, started about 1.5 ago when latching became painful. Betzaida Anti-colic. He is a pretty slow eater, does not chug bottles, paces himself.   Cup: No  Syringe/Finger: No           Formula Type: no                     Amount: n/a            Breast Milk:                      Amount: 2.5-3 ounces, doesn't always finish             Frequency Q at least one overnight feedings   Elimination Problems: No - takes 10-12 minutes to take a bottle.       Equipment:    Pump            Type: Spectra S2, haakaa             Frequency of Use: 2 x per day, collecting about 5 ounces per pump , using haakaa       Equipment Problems: yes unsure of flange size.     Mom:  Breast: mild engorgement, rounded, close spacing   Nipple Assessment in General: Normal: elongated/eraser, no discoloration healing cracks noted noted. Tender per Mom   Mother's Awareness of Feeding Cues                 Recognizes: Yes                  Verbalizes: Yes  Support System: good support   History of Breastfeeding: first time breastfeeding   Changes/Stressors/Violence: latching is painful, baby does not seem to be satisfied for long after nursing during the day.   Concerns/Goals: Lisa desires to exclusively breastfeed, she'd be happy with incorporating pumping and bottle feedings.     Problems with Mom: nipple pain     She is using Hayley saline spray - using often. Silverettes that she is not using often. Has nipple  balm she us not using often.     Physical Exam  Constitutional:       Appearance: Normal appearance.   HENT:      Head: Normocephalic.   Pulmonary:      Effort: Pulmonary effort is normal.   Musculoskeletal:         General: Normal range of motion.      Cervical back: Normal range of motion.   Neurological:      General: No focal deficit present.      Mental Status: She is alert and oriented to person, place, and time.   Skin:     General: Skin is warm.      Capillary Refill: Capillary refill takes less than 2 seconds.   Psychiatric:         Mood and Affect: Mood normal.         Behavior: Behavior normal.         Thought Content: Thought content normal.         Judgment: Judgment normal.       Infant:  Behaviors: Alert  Color: Pink  Birth weight: 2935 g   Current weight: 3650 g     Problems with infant: tension, narrow gape, preference turning to the left side       General Appearance:  Alert, active, no distress                             Head:  Slight flattening noted to the left occipital area, AFOF, sutures opposed                             Eyes:  Conjunctiva clear, no drainage                              Ears:  Normally placed, no anomolies                             Nose:  Septum intact, no drainage or erythema                           Mouth:  No lesions. Tongue is at the roof of his mouth at rest, lateralizes well, extends past lip. Narrow gape.  Full cup on gloved finger, maintains cup when gentle pressure placed on mandible and when turning side to side. Peristasis when sucking. Passive lift is without resistance, tongue is rounded at the tip, frenulum connects well behind alveolar ridge and tongue tip.                     Neck:  Tension when turning to the right shoulder,  symmetrical, trachea midline,                  Respiratory:  No grunting, flaring, retractions, breath sounds clear and equal            Cardiovascular:  Regular rate and rhythm. No murmur. Adequate perfusion/capillary refill.  Femoral pulse present                    Abdomen:   Soft, non-tender, no masses, bowel sounds present, no HSM             Genitourinary:  Normal male, testes descended, no discharge, swelling, or pain, anus patent                          Spine:   No abnormalities noted        Musculoskeletal:  Full range of motion          Skin/Hair/Nails:   Skin warm, dry, and intact, no rashes or abnormal dyspigmentation or lesions                Neurologic:   No abnormal movement, tone appropriate for gestational age     Latch:  Efficiency:               Lips Flanged: Yes              Depth of latch: wide              Audible Swallow: Yes, gulping at times               Visible Milk: Yes              Wide Open/ Asymmetrical: Yes              Suck Swallow Cycle: Breathing: yes, Coordinated: yes  Nipple Assessment after latch: Normal: elongated/eraser, no discoloration and no damage noted.  Latch Problems: None today, mom reports tenderness 1/10 pain but likely related to ongoing tenderness.     Position:  Infant's Ergonomics/Body               Body Alignment: Yes               Head Supported: Yes               Close to Mom's body/ Lifted/ Supported: Yes               Mom's Ergonomics/Body: Yes                           Supported: Yes                           Sitting Back: Yes                           Brings Baby to her breast: Yes  Positioning Problems: Reviewed BN hold and Mom returns this demonstration well. Both appear comfortable.       Handouts:   PT    Education:  Reviewed Latch: importance of deep latch without pain.   Reviewed Positioning for Dyad: proper alignment and head angle when positioning at the breast   Reviewed Frequency/Supply & Demand: offer the breast at each feeding, pump if baby is not latching and effective transferring milk.   Reviewed Infant:Cues and varied States of Awareness: watch for hunger cues, feed on demand. If baby seems satisfied at the breast (calm, relaxed sleeping, breasts are  softer) no need to pump or supplement   Reviewed Infant Elimination: goal of 6+ wets and 2-3 stools per day   Reviewed Alternative/Artificial Feedings: paced bottle feeding technique demonstrated  Reviewed Mom/Breast care: gentle handling of the breast at all times, discussed lymphatic drainage and reverse pressure softening, as well as tips for healing sore nipples.    Reviewed Equipment: Hand pump and electric pump general guidance, Discussed proper flange fit, how to measure        Plan:      Reassurance provided that baby is growing well at this time. Cont with positioning adjustments and watch for signs of effective feeding. Pump only if wanting to replace feeding at the breast with bottle feeding or if latching becomes painful. Avoid use of haakaa. Supportive bra, old compresses, lymphatic drainage massage, and ibuprofen recommended for engorgement relief while down regulating supply. Gentle handling of the breast at all times to preserve integrity.  Contact [facility] for breastfeeding support as needed or ongoing concerns with latching comfort and milk transfer.     I have spent 90 minutes with Patient and family today in which greater than 50% of this time was spent in counseling/coordination of care regarding Patient and family education.

## 2024-07-22 NOTE — PROGRESS NOTES
I have reviewed the notes, assessments, and/or procedures performed by Mai Elder RN, IBCLC, I concur with her/his documentation of Lisa Velasquez MD 07/21/24

## 2024-07-29 ENCOUNTER — POSTPARTUM VISIT (OUTPATIENT)
Dept: OBGYN CLINIC | Facility: CLINIC | Age: 34
End: 2024-07-29

## 2024-07-29 VITALS
WEIGHT: 151.2 LBS | DIASTOLIC BLOOD PRESSURE: 68 MMHG | HEIGHT: 65 IN | SYSTOLIC BLOOD PRESSURE: 104 MMHG | BODY MASS INDEX: 25.19 KG/M2

## 2024-07-29 PROBLEM — O26.899 RH NEGATIVE STATE IN ANTEPARTUM PERIOD: Status: RESOLVED | Noted: 2024-01-29 | Resolved: 2024-07-29

## 2024-07-29 PROBLEM — O26.893 RH NEGATIVE STATE IN ANTEPARTUM PERIOD, THIRD TRIMESTER: Status: RESOLVED | Noted: 2024-04-08 | Resolved: 2024-07-29

## 2024-07-29 PROBLEM — Z3A.39 39 WEEKS GESTATION OF PREGNANCY: Status: RESOLVED | Noted: 2023-12-20 | Resolved: 2024-07-29

## 2024-07-29 PROBLEM — O13.9 GESTATIONAL HYPERTENSION: Status: RESOLVED | Noted: 2024-06-23 | Resolved: 2024-07-29

## 2024-07-29 PROBLEM — Z67.91 RH NEGATIVE STATE IN ANTEPARTUM PERIOD: Status: RESOLVED | Noted: 2024-01-29 | Resolved: 2024-07-29

## 2024-07-29 PROBLEM — Z67.91 RH NEGATIVE STATE IN ANTEPARTUM PERIOD, THIRD TRIMESTER: Status: RESOLVED | Noted: 2024-04-08 | Resolved: 2024-07-29

## 2024-07-29 PROCEDURE — 99024 POSTOP FOLLOW-UP VISIT: CPT | Performed by: OBSTETRICS & GYNECOLOGY

## 2024-07-29 NOTE — PROGRESS NOTES
Assessment & Plan     Normal postpartum exam.     1. Contraception: condoms  2. Annual exam due in November.   3. Increase activity as tolerated, may resume all normal activity.  4. Lactation consult needed: no; if yes, [facility] information discussed.         Subjective   Chief Complaint   Patient presents with    Postpartum Care     24 39w4d / 0h 42m 2935 g (6 lb 7.5 oz) M Vag-Spont Epidural) 2° laceration       Lisa Smiley is a 34 y.o.  female who presents for a postpartum visit.     Delivery Method: Vaginal, Spontaneous   Delivery Date and Time: 2024 6:34 PM  Delivery Attending: Susanne Dowling  Gestational Age at delivery: 39w4d   Route of Delivery: Vaginal, Spontaneous        Admitting Diagnoses:   Patient Active Problem List   Diagnosis    39 weeks gestation of pregnancy    Rh negative state in antepartum period    Rh negative state in antepartum period, third trimester    Gestational hypertension     (spontaneous vaginal delivery)       Gestational Diabetes: no  Pregnancy Complications: none    Anesthesia: Epidural,     Delivery: Vaginal, Spontaneous at 2024 6:34 PM  Laceration: Perineal: 2° Repaired? Yes    Baby's Name: Ki  Baby's Weight: 2935 g (6 lb 7.5 oz); 103.53    Apgar scores: 9  and 9  at 1 and 5 minutes, respectively  Breast or formula: Breastfeeding  Pediatrician: [facility]    Bleeding no bleeding. Bowel function: normal. Bladder function: normal. The patient is not having any pain.     Patient has not been sexually active. Desired contraception method is none.     Postpartum depression screening: positive. EPDS : 5. She denies depression/anxiety/trouble sleeping.     Last Pap : 10/28/22 ; no abnormalities      The following portions of the patient's history were reviewed and updated as appropriate: allergies, current medications, past family history, past medical history, past social history, past surgical history, and problem  list.      Current Outpatient Medications:     acetaminophen (TYLENOL) 325 mg tablet, Take 2 tablets (650 mg total) by mouth every 4 (four) hours as needed for mild pain (Patient not taking: Reported on 7/19/2024), Disp: , Rfl:     benzocaine-menthol-lanolin-aloe (DERMOPLAST) 20-0.5 % topical spray, Apply 1 Application topically 4 (four) times a day as needed (perineal pain) (Patient not taking: Reported on 7/19/2024), Disp: , Rfl:     ibuprofen (MOTRIN) 600 mg tablet, Take 1 tablet (600 mg total) by mouth every 6 (six) hours as needed for mild pain (Patient not taking: Reported on 7/19/2024), Disp: 60 tablet, Rfl: 0    witch hazel-glycerin (TUCKS) topical pad, Apply 1 Pad topically every 4 (four) hours as needed for irritation (Patient not taking: Reported on 7/19/2024), Disp: , Rfl:     No Known Allergies    Review of Systems  Review of Systems   Constitutional:  Positive for fatigue.   HENT: Negative.     Respiratory: Negative.     Cardiovascular: Negative.    Gastrointestinal: Negative.    Genitourinary: Negative.    Neurological: Negative.    Psychiatric/Behavioral: Negative.           Objective      LMP 09/20/2023 (Exact Date)     Physical Exam  Constitutional:       Appearance: Normal appearance. She is normal weight.   HENT:      Head: Normocephalic and atraumatic.   Eyes:      Conjunctiva/sclera: Conjunctivae normal.      Pupils: Pupils are equal, round, and reactive to light.   Pulmonary:      Effort: Pulmonary effort is normal.   Neurological:      General: No focal deficit present.      Mental Status: She is alert and oriented to person, place, and time.   Psychiatric:         Mood and Affect: Mood normal.         Behavior: Behavior normal.   Vitals and nursing note reviewed.

## 2024-09-03 ENCOUNTER — OFFICE VISIT (OUTPATIENT)
Dept: PHYSICAL THERAPY | Facility: REHABILITATION | Age: 34
End: 2024-09-03
Payer: COMMERCIAL

## 2024-09-03 DIAGNOSIS — N81.89 PELVIC FLOOR WEAKNESS: Primary | ICD-10-CM

## 2024-09-03 DIAGNOSIS — M62.08 DIASTASIS RECTI: ICD-10-CM

## 2024-09-03 PROCEDURE — 97530 THERAPEUTIC ACTIVITIES: CPT | Performed by: PHYSICAL THERAPIST

## 2024-09-03 PROCEDURE — 97162 PT EVAL MOD COMPLEX 30 MIN: CPT | Performed by: PHYSICAL THERAPIST

## 2024-09-03 NOTE — PROGRESS NOTES
PT Evaluation     Today's date: 9/3/2024  Patient name: Lisa Smiley  : 1990  MRN: [id number]  Referring provider: Amirah Moreau, MARÍA ELENA  Dx:   Encounter Diagnosis     ICD-10-CM    1. Pelvic floor weakness  N81.89       2. Postpartum state  Z39.2       3. Diastasis recti  M62.08                      Assessment  Impairments: abnormal coordination, abnormal muscle firing, abnormal muscle tone, abnormal or restricted ROM, activity intolerance, impaired physical strength and lacks appropriate home exercise program  Symptom irritability: moderate    Assessment details: Lisa Smiley is a 34 y.o. female who presents with concerns of muscle weakness and desire for safe return to exercise following pregnancy and delivery. She presents with weakness and poor holding endurance of the pelvic floor as mild diastasis recti. She was very physically active prior to pregnancy and wishes to return to prior level of activity with guidance.       The plan of care was discussed and included education regarding pelvic floor anatomy, explanation of exam technique, explanation of exam findings and discussion of treatment plan as well as the importance of patient compliance and adherence to physical therapy visits. Patient would benefit from skilled physical therapy services  to address deficits and ultimately meet goal of independent self management of condition.     Patient provided written and verbal consent for pelvic floor muscle exam: yes         Understanding of Dx/Px/POC: good     Prognosis: good    Goals  STGs to be met in 4 weeks:  * Patient will be compliant with introductory HEP as prescribed.  * Patient presents with good understanding of pelvic floor protective strategies to reduce intra-abdominal pressure against pelvic organs.    LTGs to be met by discharge:  * Demonstrates correct isolation and relaxation of pelvic floor to palpation without overflow from global stabilizers.  * Present with good coordination of all 4 deep  core stabilizers without coning/doming at midline.   * Patient will use pelvic floor muscles correctly during functional ADLs such as coughing, sneezing, lifting and exercise activities to avoid excessive IAP and PFM strain.   * Patient will be compliant with comprehensive home exercise program for self management of condition.       Plan  Patient would benefit from: skilled physical therapy  Referral necessary: No  Planned modality interventions: biofeedback    Planned therapy interventions: manual therapy, neuromuscular re-education, patient/caregiver education, therapeutic activities, strengthening, therapeutic exercise, home exercise program and breathing training    Frequency: 1x week  Duration in weeks: 12  Plan of Care beginning date: 9/3/2024  Plan of Care expiration date: 2024  Treatment plan discussed with: patient and PTA      PT Pelvic Floor Subjective:   History of Present Illness:   Patient is  baby boy Ki weighed 6lb7oz at 39W4D via . Self reports Gd 1/2 tear with repair. She is breastfeeding and pumping. Will be RTW 24 works remotely, full-time. 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       She has family nearby.     Reports mild LBP  with holding baby frequently. Better with rest. Denies LBP prior to pregnancy. Mechanism of injury: childbirth    Social Support:     Lives in:  Multiple-level home    Relationship status: /committed  Diet and Exercise:      Gym -   Walking  Running -     Is currently exercising about 3 days/week  OB/ gyn History    Gestational History:     Prior Pregnancy: Yes      Number of prior pregnancies: 1    Number of term pregnancies: 1    Delivery Type: vaginal delivery      Menstrual History:      Menstrual irregularities regular menses  Bladder Function:     Voiding Difficulties positive for: urgency       Voiding Difficulties comments:     Voiding frequency: every 3-4 hours and every 1-2 hours    Urinary leakage: urine leakage    Urinary leakage  "aggravated by: walking to the bathroom    Nocturia (episodes per night): 2 and 1    Painful urination: No      Intake (ounces): Water intake (oz): 60-90 oz. Coffee intake (oz): 10 oz.   Bowel Function:     Bowel frequency: multiple times a day and daily    McLennan Stool Scale: type 4 and type 3  Sexual Function:     Sexually Active:  Sexually active    Pain during intercourse: Yes      Lubrication Use: Yes    pain does not cause abstinence  Pain:     Current pain ratin    At best pain ratin    At worst pain rating:  3    Location:  [place]    Onset:  1-3 months ago    Quality:  Dull ache  Diagnostic Tests:     None        Objective       Abdominal Assessment:      Abdominal Assessment: Good tensioning along linea alba    Diastatis   Diastasis recti present: yes  3\" above umbilicus (# fingers): 1  Umbilicus (# fingers): 1.5  3\" below umbilicus (# fingers): 1  Connective tissue integrity at linea alba: firm  no tenderness at linea alba  able to engage transverse abdominis     Skin inspection:   no scars present.       General Perineum Exam:   perineum intact.     General perineum exam comments: No discharge, irritation, organ prolapse or skin breakdown evident    Sensation intact  Able to appropriately engage, contract and lengthen pelvic floor muscles with appropriate isolation without overflow from global stabilizers    Visual Inspection of Perineum:   Excursion of perineal body in cephalad direction with contraction of pelvic floor muscles (PFM): delayed  and fair   Excursion of perineal body in caudal direction with relaxation of pelvic floor muscles (PFM): delayed , weak and fair   Involuntary contraction with coughing: yes  Involuntary relaxation with bearing down: yes  Cotton swab test: non-tender  Cough reflex: cough reflex  Sphincter Tone Resting: normal  Sphincter Tone Squeeze: normal  Sensation: intact    Pelvic Organ Prolapse   no pelvic organ prolapse  Perineal body inspection: within normal " limits        Pelvic Floor Muscle Exam:      Breathing pattern with contraction: within normal limits         PERFECT Score   Power right: 2/5   Power left: 2/5   Endurance (seconds to max): 5   Repetitions (before fatigue): 5        pelvic floor exam consent given by patient    Pelvic exam completed: vaginally     SMEG Biofeedback   to be assessed next treatment             Precautions:   Patient Active Problem List   Diagnosis   (none) - all problems resolved or deleted       PRO EVAL RE-EVAL DISCHARGE   PFDI      SOPHIA-18      VPQ      CPSI-NIH      PGQ          POC Expires Auth Status Start Date Exp Date PT Visit Limit DA expires DA provider   11/26               Date of Service 9/3           Visits Used            Visits Remaining                        Manuals                                                            Neuro Re-Ed                                                                                                Ther Ex                                                                                    Ther Activity            Education Anatomy and POC                                                                        Modalities

## 2024-09-09 NOTE — PROGRESS NOTES
Daily Note     Today's date: 9/10/2024  Patient name: Lisa Smiley  : 1990  MRN: [id number]  Referring provider: Amirah Moreau, MARÍA ELENA  Dx:   Encounter Diagnosis     ICD-10-CM    1. Postpartum state  Z39.2       2. Pelvic floor weakness  N81.89       3. Diastasis recti  M62.08         Patient is  baby boy Ki weighed 6lb7oz at 39W4D via . Self reports Gd 1/2 tear with repair. She is breastfeeding and pumping. Will be RTW 24 works remotely, full-time. 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       She has family nearby.     Reports mild LBP  with holding baby frequently. Better with rest. Denies LBP prior to pregnancy. Mechanism of injury: childbirth             Subjective: Two days ago ran 2 miles, gym weightlifting/machines, stroller walking.       Objective: See treatment diary below    Postpartum Return to Run Checklist  Baseline symptoms- MET  No symptoms of incontinence or pelvic pain with daily life  No measureable pelvic organ prolapse on vaginal exam  No abdominal wall doming/bulge (diastasis rectus abdominis) with Active Single Leg Raise and Modified Curl Up tests  Pelvic floor muscle strength (via intra-vaginal exam) - DEFERRED ASSESSMENT ON VISIT #2  Rated at least a 3 out of 5 on a scale of 0-5 by your physical therapist via intra-vaginal assessment.  Pelvic floor muscle endurance (in standing) - MET  10 fast repetitions  8-12 repetitions, holding each for 6-8 seconds with maximal strength  60 second contraction holding at least 50% strength  Leg Strength  No deficits in all key hip and abdominal muscles, measured isometrically. MET  Single-leg heel raise: 20 repetitions MET  Single leg bridge: 20 repetitions MET  Single leg sit-to-stand: 20 repetitions MET  Side-lying leg raise (hip abduction): 20 repetitions  Load and impact assessment with no symptoms of pain, pressure, or incontinence:  Walking 30 minutes at a fast pace (at least 3.5 mph)  Single leg balance 10 sec each leg  Single leg  "squat 10 rep each side  Jog on the spot 1 min  Forward bounds 10 rep  Hop in place 10 rep each leg  Single leg 'running man' (opposite arm and hip flexion/extension with bent knee) for 10 rep each side    Current HEP: 9/10/2024  1. PFM + SLR  2. PFM + supine arm flexion 3#  3. PFM quick flicks and 50% endurance holds with BF, 5 second holds with breath while brushing teeth  4. Single leg sit to stand.    Access Code: [email or web address]  URL: [email or web address]  Date: 09/10/2024  Prepared by: Amirah Moreau    Program Notes  Lisa, We discussed a few ways to do \"kegels.\" Here are a few suggestions:1. While feeding the baby, do \"quick holds\" for 10 reps. Be sure to relax between each hold. Also do \"endurance holds\" which entail a 50% kegel for 60 seconds. Try to do these during 2-3 feedings a day. 2. While brushing your teeth, combine an exhale and kegel and hold for about 6 seconds. Repeat this for the whole time you are standing and brushing your teeth.     Exercises  - Deep core foundation  - 2 x daily - 7 x weekly  - Small Range Straight Leg Raise  - 1 x daily - 7 x weekly  - Supine Alternating Shoulder Flexion  - 1 x daily - 7 x weekly - 10 reps  - Single leg sit to stand   - 1 x daily - 7 x weekly - 10 reps      Assessment: Tolerated treatment well. Patient exhibited good technique with therapeutic exercises and would benefit from continued PT. Began in supine with explanation of core canister and patient turning all 4 muscle groups on together. Added repeated SLR and alt shoulder flexion (w 3# dumbbells to this ). Progressed to standing kegels (3 ways) and then RTR assessment as charted with MET. Issued HEP above and she will be further assessed with RTR protocol next visit. Advised that very slow running is ok at this point as long as she is not symptomatic of any of the 3 \"P's.\" Expressed understanding.       Plan: Continue per plan of care.      Precautions:   Patient Active Problem List   Diagnosis "   (none) - all problems resolved or deleted       PRO EVAL RE-EVAL DISCHARGE   PFDI      SOPHIA-18      VPQ      CPSI-NIH      PGQ          POC Expires Auth Status Start Date Exp Date PT Visit Limit DA expires DA provider   11/26     10/3 signed          Date of Service 9/3 9/10          Visits Used            Visits Remaining                        Manuals                                                            Neuro Re-Ed            Core canister training  15'                                                                                  Ther Ex            RTR assessment   40'                                                                      Ther Activity            Education Anatomy and POC                                                                        Modalities

## 2024-09-10 ENCOUNTER — OFFICE VISIT (OUTPATIENT)
Dept: PHYSICAL THERAPY | Facility: REHABILITATION | Age: 34
End: 2024-09-10
Payer: COMMERCIAL

## 2024-09-10 DIAGNOSIS — M62.08 DIASTASIS RECTI: ICD-10-CM

## 2024-09-10 DIAGNOSIS — N81.89 PELVIC FLOOR WEAKNESS: ICD-10-CM

## 2024-09-10 PROCEDURE — 97110 THERAPEUTIC EXERCISES: CPT | Performed by: PHYSICAL THERAPIST

## 2024-09-10 PROCEDURE — 97112 NEUROMUSCULAR REEDUCATION: CPT | Performed by: PHYSICAL THERAPIST

## 2024-09-17 ENCOUNTER — NURSE TRIAGE (OUTPATIENT)
Age: 34
End: 2024-09-17

## 2024-09-17 ENCOUNTER — OFFICE VISIT (OUTPATIENT)
Dept: PHYSICAL THERAPY | Facility: REHABILITATION | Age: 34
End: 2024-09-17
Payer: COMMERCIAL

## 2024-09-17 DIAGNOSIS — N81.89 PELVIC FLOOR WEAKNESS: ICD-10-CM

## 2024-09-17 PROCEDURE — 97110 THERAPEUTIC EXERCISES: CPT

## 2024-09-17 NOTE — PROGRESS NOTES
Daily Note     Today's date: 2024  Patient name: Lisa Smiley  : 1990  MRN: [id number]  Referring provider: Amirah Moreau, MARÍA ELENA  Dx:   Encounter Diagnosis     ICD-10-CM    1. Postpartum state  Z39.2       2. Pelvic floor weakness  N81.89           Patient is  baby boy Ki weighed 6lb7oz at 39W4D via . Self reports Gd 1/2 tear with repair. She is breastfeeding and pumping. Will be RTW 24 works remotely, full-time. 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       She has family nearby.     Reports mild LBP  with holding baby frequently. Better with rest. Denies LBP prior to pregnancy. Mechanism of injury: childbirth  Start Time: 930  Stop Time:   Total time in clinic (min): 45 minutes    Subjective: Feels good overall but was sore after last session.       Objective: See treatment diary below    Vitals 106/70  O2sat 99  HR 58    Postpartum Return to Run Checklist  Baseline symptoms- MET  No symptoms of incontinence or pelvic pain with daily life  No measureable pelvic organ prolapse on vaginal exam  No abdominal wall doming/bulge (diastasis rectus abdominis) with Active Single Leg Raise and Modified Curl Up tests  Pelvic floor muscle strength (via intra-vaginal exam) - DEFERRED ASSESSMENT ON VISIT #2  Rated at least a 3 out of 5 on a scale of 0-5 by your physical therapist via intra-vaginal assessment.  Pelvic floor muscle endurance (in standing) - MET  10 fast repetitions  8-12 repetitions, holding each for 6-8 seconds with maximal strength  60 second contraction holding at least 50% strength  Leg Strength  No deficits in all key hip and abdominal muscles, measured isometrically. MET  Single-leg heel raise: 20 repetitions MET  Single leg bridge: 20 repetitions MET  Single leg sit-to-stand: 20 repetitions MET  Side-lying leg raise (hip abduction): 20 repetitions on  R 12x, L 13x   Load and impact assessment with no symptoms of pain, pressure, or incontinence:  Walking 30 minutes at a fast pace  "(at least 3.5 mph)  Single leg balance 10 sec each leg MET   Single leg squat 10 rep each side PROGRESSING 09/17  Jog on the spot 1 min MET  Forward bounds 10 rep  Hop in place 10 rep each leg MET  Single leg 'running man' (opposite arm and hip flexion/extension with bent knee) for 10 rep each side MET    Current HEP: 9/17/2024  1. PFM + SLR  2. PFM + supine arm flexion 3#  3. PFM quick flicks and 50% endurance holds with BF, 5 second holds with breath while brushing teeth  4. Single leg sit to stand.  5. S/L hip abduction  6. Single leg squat    Access Code: [email or web address]  URL: [email or web address]  Date: 09/10/2024  Prepared by: Amirah Moreau    Program Notes  Lisa, We discussed a few ways to do \"kegels.\" Here are a few suggestions:1. While feeding the baby, do \"quick holds\" for 10 reps. Be sure to relax between each hold. Also do \"endurance holds\" which entail a 50% kegel for 60 seconds. Try to do these during 2-3 feedings a day. 2. While brushing your teeth, combine an exhale and kegel and hold for about 6 seconds. Repeat this for the whole time you are standing and brushing your teeth.     Exercises  - Deep core foundation  - 2 x daily - 7 x weekly  - Small Range Straight Leg Raise  - 1 x daily - 7 x weekly  - Supine Alternating Shoulder Flexion  - 1 x daily - 7 x weekly - 10 reps  - Single leg sit to stand   - 1 x daily - 7 x weekly - 10 reps      Assessment: Tolerated treatment well. Patient exhibited good technique with therapeutic exercises and would benefit from continued PT. Challenged with addition of sideline hip abduction with fatigue vs pain. Also introduced some reformer exercises at the end of the session, challenged appropriately. Assessed vitals due to recent symptoms of dizziness, found to be WNL today.    Plan: Continue per plan of care.      Precautions:   Patient Active Problem List   Diagnosis   (none) - all problems resolved or deleted       PRO EVAL RE-EVAL DISCHARGE   PFDI    "   SOPHIA-18      VPQ      CPSI-NIH      PGQ          POC Expires Auth Status Start Date Exp Date PT Visit Limit DA expires DA provider   11/26     10/3 signed          Date of Service 9/3 9/10 9/17         Visits Used            Visits Remaining                        Manuals                                                            Neuro Re-Ed            Core canister training  15'                                                                                  Ther Ex            RTR assessment   40' 10'         S/l hip abduction   12 R  13 L           S/l hip circles   10x CW CCW         S/l sit to stand on foam   10x each side         Single leg squat   10x                     Running man   20x + resistance        reformer   Wheelbarrows both ways 1B         TM    8'          Ther Activity            Education Anatomy and POC                                                                        Modalities

## 2024-09-17 NOTE — TELEPHONE ENCOUNTER
I am 12 weeks PP, breastfeeding and pumping. I have been getting spouts of lightheadedness and dizziness randomly which started a few weeks ago.     I am hydrated, and believe i am eating enough. I wasn't sure if this would be related to PP or if I should go to a family practice.

## 2024-09-17 NOTE — TELEPHONE ENCOUNTER
Patient returning call. Reviewed symptoms as described below. Reviewed hydration and patient states she usually drinks about 64 oz of water a day and try to incorporate coconut water and/or liquid IV. Reviewed with breastfeeding, hydration demand is even higher that pregnancy and advise 100-120 oz daily. Reviewed expected increased calorie intake. Reviewed that at 3 months post partum, would not expect her symptoms to be directly related to lactation and advise she contact her PCP for further work up. Patient verbalizes understanding. Message to Dr. Dowling for review.

## 2024-09-19 ENCOUNTER — OFFICE VISIT (OUTPATIENT)
Dept: FAMILY MEDICINE CLINIC | Facility: CLINIC | Age: 34
End: 2024-09-19
Payer: COMMERCIAL

## 2024-09-19 VITALS
OXYGEN SATURATION: 99 % | BODY MASS INDEX: 24.96 KG/M2 | WEIGHT: 149.8 LBS | TEMPERATURE: 97.8 F | HEART RATE: 54 BPM | SYSTOLIC BLOOD PRESSURE: 122 MMHG | RESPIRATION RATE: 16 BRPM | DIASTOLIC BLOOD PRESSURE: 86 MMHG | HEIGHT: 65 IN

## 2024-09-19 DIAGNOSIS — R42 DIZZINESS: Primary | ICD-10-CM

## 2024-09-19 PROCEDURE — 99213 OFFICE O/P EST LOW 20 MIN: CPT | Performed by: FAMILY MEDICINE

## 2024-09-19 PROCEDURE — 99395 PREV VISIT EST AGE 18-39: CPT | Performed by: FAMILY MEDICINE

## 2024-09-19 NOTE — PROGRESS NOTES
Adult Annual Physical  Name: Lisa Smiley      : 1990      MRN: [id number]  Encounter Provider: Anjum Siu DO  Encounter Date: 2024   Encounter department: [facility]    Assessment & Plan  Dizziness  Will check labs due to recent postpartum status. Follow up with results when available. Ensure adequate hydration and nutrition while breastfeeding.  Orders:    CBC and Platelet; Future    Basic metabolic panel; Future    Iron Panel (Includes Ferritin, Iron Sat%, Iron, and TIBC); Future    Immunizations and preventive care screenings were discussed with patient today. Appropriate education was printed on patient's after visit summary.    Counseling:  Alcohol/drug use: discussed moderation in alcohol intake, the recommendations for healthy alcohol use, and avoidance of illicit drug use.  Dental Health: discussed importance of regular tooth brushing, flossing, and dental visits.  Sexual health: discussed sexually transmitted diseases, partner selection, use of condoms, avoidance of unintended pregnancy, and contraceptive alternatives.  Exercise: the importance of regular exercise/physical activity was discussed. Recommend exercise 3-5 times per week for at least 30 minutes.          History of Present Illness       13 weeks postpartum, has been feeling lightheaded and dizzy since. Vaginal delivery, quick recovery. She is breastfeeding, maybe not drinking adequately. Usually feels dizzy in the afternoon or evening, sporadic. Not just with standing or changes in position.      Adult Annual Physical:  Patient presents for annual physical.     Diet and Physical Activity:  - Diet/Nutrition: well balanced diet. pescatarian  - Exercise: moderate cardiovascular exercise.    Depression Screening:  - PHQ-2 Score: 0    General Health:  - Sleep: sleeps well.  - Hearing: normal hearing bilateral ears.  - Vision: no vision problems.  - Dental: regular dental visits.    /GYN Health:  -  "Follows with GYN: yes.     Review of Systems  Current Outpatient Medications on File Prior to Visit   Medication Sig Dispense Refill    Prenatal MV-Min-Fe Fum-FA-DHA (PRENATAL 1 PO) Take by mouth daily       No current facility-administered medications on file prior to visit.      Social History     Tobacco Use    Smoking status: Never    Smokeless tobacco: Never   Vaping Use    Vaping status: Never Used   Substance and Sexual Activity    Alcohol use: Not Currently     Comment: socially- prior to pregnancy    Drug use: Never    Sexual activity: Not Currently     Partners: Male     Birth control/protection: None       Objective     /86 (BP Location: Left arm, Patient Position: Sitting, Cuff Size: Standard)   Pulse (!) 54   Temp 97.8 °F (36.6 °C) (Temporal)   Resp 16   Ht 5' 5\" (1.651 m)   Wt 67.9 kg (149 lb 12.8 oz)   SpO2 99%   Breastfeeding Yes   BMI 24.93 kg/m²     Physical Exam  Vitals reviewed.   Constitutional:       Appearance: Normal appearance.   HENT:      Right Ear: External ear normal.      Left Ear: External ear normal.      Mouth/Throat:      Mouth: Mucous membranes are moist.      Pharynx: Oropharynx is clear.   Eyes:      Extraocular Movements: Extraocular movements intact.      Conjunctiva/sclera: Conjunctivae normal.   Cardiovascular:      Rate and Rhythm: Normal rate and regular rhythm.      Heart sounds: Normal heart sounds.   Pulmonary:      Effort: Pulmonary effort is normal.      Breath sounds: Normal breath sounds.   Abdominal:      General: Abdomen is flat.   Skin:     General: Skin is warm and dry.   Neurological:      Mental Status: She is alert.   Psychiatric:         Mood and Affect: Mood normal.         Behavior: Behavior normal.         "

## 2024-09-24 ENCOUNTER — APPOINTMENT (OUTPATIENT)
Dept: LAB | Facility: CLINIC | Age: 34
End: 2024-09-24
Payer: COMMERCIAL

## 2024-09-24 ENCOUNTER — OFFICE VISIT (OUTPATIENT)
Dept: PHYSICAL THERAPY | Facility: REHABILITATION | Age: 34
End: 2024-09-24
Payer: COMMERCIAL

## 2024-09-24 DIAGNOSIS — N81.89 PELVIC FLOOR WEAKNESS: ICD-10-CM

## 2024-09-24 DIAGNOSIS — R42 DIZZINESS: ICD-10-CM

## 2024-09-24 LAB
ANION GAP SERPL CALCULATED.3IONS-SCNC: 6 MMOL/L (ref 4–13)
BUN SERPL-MCNC: 16 MG/DL (ref 5–25)
CALCIUM SERPL-MCNC: 9.4 MG/DL (ref 8.4–10.2)
CHLORIDE SERPL-SCNC: 102 MMOL/L (ref 96–108)
CO2 SERPL-SCNC: 28 MMOL/L (ref 21–32)
CREAT SERPL-MCNC: 0.68 MG/DL (ref 0.6–1.3)
ERYTHROCYTE [DISTWIDTH] IN BLOOD BY AUTOMATED COUNT: 14.7 % (ref 11.6–15.1)
FERRITIN SERPL-MCNC: 9 NG/ML (ref 11–307)
GFR SERPL CREATININE-BSD FRML MDRD: 116 ML/MIN/1.73SQ M
GLUCOSE SERPL-MCNC: 71 MG/DL (ref 65–140)
HCT VFR BLD AUTO: 37.8 % (ref 34.8–46.1)
HGB BLD-MCNC: 11.9 G/DL (ref 11.5–15.4)
IRON SATN MFR SERPL: 10 % (ref 15–50)
IRON SERPL-MCNC: 46 UG/DL (ref 50–212)
MCH RBC QN AUTO: 26.5 PG (ref 26.8–34.3)
MCHC RBC AUTO-ENTMCNC: 31.5 G/DL (ref 31.4–37.4)
MCV RBC AUTO: 84 FL (ref 82–98)
PLATELET # BLD AUTO: 232 THOUSANDS/UL (ref 149–390)
PMV BLD AUTO: 10.8 FL (ref 8.9–12.7)
POTASSIUM SERPL-SCNC: 4.5 MMOL/L (ref 3.5–5.3)
RBC # BLD AUTO: 4.49 MILLION/UL (ref 3.81–5.12)
SODIUM SERPL-SCNC: 136 MMOL/L (ref 135–147)
TIBC SERPL-MCNC: 463 UG/DL (ref 250–450)
UIBC SERPL-MCNC: 417 UG/DL (ref 155–355)
WBC # BLD AUTO: 7.57 THOUSAND/UL (ref 4.31–10.16)

## 2024-09-24 PROCEDURE — 36415 COLL VENOUS BLD VENIPUNCTURE: CPT

## 2024-09-24 PROCEDURE — 80048 BASIC METABOLIC PNL TOTAL CA: CPT

## 2024-09-24 PROCEDURE — 85027 COMPLETE CBC AUTOMATED: CPT

## 2024-09-24 PROCEDURE — 97110 THERAPEUTIC EXERCISES: CPT

## 2024-09-24 PROCEDURE — 82728 ASSAY OF FERRITIN: CPT

## 2024-09-24 PROCEDURE — 83550 IRON BINDING TEST: CPT

## 2024-09-24 PROCEDURE — 83540 ASSAY OF IRON: CPT

## 2024-09-24 NOTE — PROGRESS NOTES
Daily Note     Today's date: 2024  Patient name: Lisa Smiley  : 1990  MRN: [id number]  Referring provider: Amirah Moreau, MARÍA ELENA  Dx:   Encounter Diagnosis     ICD-10-CM    1. Postpartum state  Z39.2       2. Pelvic floor weakness  N81.89             Patient is  baby boy Ki weighed 6lb7oz at 39W4D via . Self reports Gd 1/2 tear with repair. She is breastfeeding and pumping. Will be RTW 24 works remotely, full-time. 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       She has family nearby.     Reports mild LBP  with holding baby frequently. Better with rest. Denies LBP prior to pregnancy. Mechanism of injury: childbirth  Start Time: 930  Stop Time:   Total time in clinic (min): 45 minutes    Subjective: No changes, feels good.       Objective: See treatment diary below    Postpartum Return to Run Checklist  Baseline symptoms- MET  No symptoms of incontinence or pelvic pain with daily life  No measureable pelvic organ prolapse on vaginal exam  No abdominal wall doming/bulge (diastasis rectus abdominis) with Active Single Leg Raise and Modified Curl Up tests  Pelvic floor muscle strength (via intra-vaginal exam) - DEFERRED ASSESSMENT ON VISIT #2  Rated at least a 3 out of 5 on a scale of 0-5 by your physical therapist via intra-vaginal assessment.  Pelvic floor muscle endurance (in standing) - MET  10 fast repetitions  8-12 repetitions, holding each for 6-8 seconds with maximal strength  60 second contraction holding at least 50% strength  Leg Strength  No deficits in all key hip and abdominal muscles, measured isometrically. MET  Single-leg heel raise: 20 repetitions MET  Single leg bridge: 20 repetitions MET  Single leg sit-to-stand: 20 repetitions MET  Side-lying leg raise (hip abduction): 20 repetitions on  R 12x, L 13x   Load and impact assessment with no symptoms of pain, pressure, or incontinence:  Walking 30 minutes at a fast pace (at least 3.5 mph)  Single leg balance 10 sec each leg MET  "  Single leg squat 10 rep each side PROGRESSING 09/17  Jog on the spot 1 min MET  Forward bounds 10 rep  Hop in place 10 rep each leg MET  Single leg 'running man' (opposite arm and hip flexion/extension with bent knee) for 10 rep each side MET    Current HEP: 9/24/2024  1. PFM + SLR  2. PFM + supine arm flexion 3#  3. PFM quick flicks and 50% endurance holds with BF, 5 second holds with breath while brushing teeth  4. Single leg sit to stand.  5. S/L hip abduction  6. Single leg squat    Access Code: [email or web address]  URL: [email or web address]  Date: 09/10/2024  Prepared by: Amirah Moreau    Program Notes  Lisa, We discussed a few ways to do \"kegels.\" Here are a few suggestions:1. While feeding the baby, do \"quick holds\" for 10 reps. Be sure to relax between each hold. Also do \"endurance holds\" which entail a 50% kegel for 60 seconds. Try to do these during 2-3 feedings a day. 2. While brushing your teeth, combine an exhale and kegel and hold for about 6 seconds. Repeat this for the whole time you are standing and brushing your teeth.     Exercises  - Deep core foundation  - 2 x daily - 7 x weekly  - Small Range Straight Leg Raise  - 1 x daily - 7 x weekly  - Supine Alternating Shoulder Flexion  - 1 x daily - 7 x weekly - 10 reps  - Single leg sit to stand   - 1 x daily - 7 x weekly - 10 reps      Assessment: Tolerated treatment well. Patient exhibited good technique with therapeutic exercises and would benefit from continued PT. Added more challenging reformer exercises with standing series.  More challenged when wbing through her R LE.  Quick steps on blaze pods were fatiguing but no pain.     Plan: Continue per plan of care.      Precautions:   Patient Active Problem List   Diagnosis   (none) - all problems resolved or deleted       PRO EVAL RE-EVAL DISCHARGE   PFDI      SOPHIA-18      VPQ      CPSI-NIH      PGQ          POC Expires Auth Status Start Date Exp Date PT Visit Limit DA expires DA provider   11/26 " "    10/3 signed          Date of Service 9/3 9/10 9/17 09/24        Visits Used            Visits Remaining                        Manuals                                                            Neuro Re-Ed            Core canister training  15'                                                                                  Ther Ex            RTR assessment   40' 10'         S/l hip abduction   12 R  13 L           S/l hip circles   10x CW CCW         S/l sit to stand on foam   10x each side         Single leg squat   10x         Blaze pods    8 inch step + 2 inch step 4x30\" 6 pods quick work        Running man   20x + resistance 20x        reformer   Wheelbarrows both ways 1B Standing series 3 ways 1B  Half tall kneel rotation w/ pilates ring  1B straight legs        TM    8'  8'        Ther Activity            Education Anatomy and POC                                                                        Modalities                                           "

## 2024-10-01 ENCOUNTER — OFFICE VISIT (OUTPATIENT)
Dept: PHYSICAL THERAPY | Facility: REHABILITATION | Age: 34
End: 2024-10-01
Payer: COMMERCIAL

## 2024-10-01 DIAGNOSIS — M62.08 DIASTASIS RECTI: ICD-10-CM

## 2024-10-01 DIAGNOSIS — N81.89 PELVIC FLOOR WEAKNESS: ICD-10-CM

## 2024-10-01 PROCEDURE — 97110 THERAPEUTIC EXERCISES: CPT

## 2024-10-01 NOTE — PROGRESS NOTES
Daily Note     Today's date: 10/1/2024  Patient name: Lisa Smiley  : 1990  MRN: [id number]  Referring provider: Amirah Moreau, MARÍA ELENA  Dx:   Encounter Diagnosis     ICD-10-CM    1. Postpartum state  Z39.2       2. Pelvic floor weakness  N81.89       3. Diastasis recti  M62.08             Patient is  baby boy Ki weighed 6lb7oz at 39W4D via . Self reports Gd 1/2 tear with repair. She is breastfeeding and pumping. Will be RTW 24 works remotely, full-time. 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       She has family nearby.     Reports mild LBP  with holding baby frequently. Better with rest. Denies LBP prior to pregnancy. Mechanism of injury: childbirth  Start Time: 930  Stop Time:   Total time in clinic (min): 45 minutes    Subjective: Patient offers no new complaints. Overall she is doing well. Notes she is able to perform and practice Kegel's during breastfeeding and brushing her teeth with both quick and endurance holds with positive response.       Objective: See treatment diary below    Postpartum Return to Run Checklist  Baseline symptoms- MET  No symptoms of incontinence or pelvic pain with daily life  No measureable pelvic organ prolapse on vaginal exam  No abdominal wall doming/bulge (diastasis rectus abdominis) with Active Single Leg Raise and Modified Curl Up tests  Pelvic floor muscle strength (via intra-vaginal exam) - DEFERRED ASSESSMENT ON VISIT #2  Rated at least a 3 out of 5 on a scale of 0-5 by your physical therapist via intra-vaginal assessment.  Pelvic floor muscle endurance (in standing) - MET  10 fast repetitions  8-12 repetitions, holding each for 6-8 seconds with maximal strength  60 second contraction holding at least 50% strength  Leg Strength  No deficits in all key hip and abdominal muscles, measured isometrically. MET  Single-leg heel raise: 20 repetitions MET  Single leg bridge: 20 repetitions MET  Single leg sit-to-stand: 20 repetitions MET  Side-lying leg raise (hip  "abduction): 20 repetitions on 09/17 R 12x, L 13x   Load and impact assessment with no symptoms of pain, pressure, or incontinence:  Walking 30 minutes at a fast pace (at least 3.5 mph)  Single leg balance 10 sec each leg MET   Single leg squat 10 rep each side PROGRESSING 09/17  Jog on the spot 1 min MET  Forward bounds 10 rep  Hop in place 10 rep each leg MET  Single leg 'running man' (opposite arm and hip flexion/extension with bent knee) for 10 rep each side MET    Current HEP: 10/1/2024  1. PFM + SLR  2. PFM + supine arm flexion 3#  3. PFM quick flicks and 50% endurance holds with BF, 5 second holds with breath while brushing teeth  4. Single leg sit to stand.  5. S/L hip abduction  6. Single leg squat    Access Code: [email or web address]  URL: [email or web address]  Date: 09/10/2024  Prepared by: Amirah Moreau    Program Emiliano Gonzalez, We discussed a few ways to do \"kegels.\" Here are a few suggestions:1. While feeding the baby, do \"quick holds\" for 10 reps. Be sure to relax between each hold. Also do \"endurance holds\" which entail a 50% kegel for 60 seconds. Try to do these during 2-3 feedings a day. 2. While brushing your teeth, combine an exhale and kegel and hold for about 6 seconds. Repeat this for the whole time you are standing and brushing your teeth.     Exercises  - Deep core foundation  - 2 x daily - 7 x weekly  - Small Range Straight Leg Raise  - 1 x daily - 7 x weekly  - Supine Alternating Shoulder Flexion  - 1 x daily - 7 x weekly - 10 reps  - Single leg sit to stand   - 1 x daily - 7 x weekly - 10 reps      Assessment: Tolerated treatment well. Patient performed TM to increase blood flow to the area being treated, prepare the muscle for strength training and lengthening and to improve overall tolerance to activity. Continued with dynamic exercises in coordination with PFM C/R to continue to strengthening PFM and core with good tolerance and demonstrates adequate technique. General muscle fatigue " "with program; however no onset of symptoms.              Plan: Continue per plan of care.      Precautions:   Patient Active Problem List   Diagnosis   (none) - all problems resolved or deleted       PRO EVAL RE-EVAL DISCHARGE   PFDI      SOPHIA-18      VPQ      CPSI-NIH      PGQ          POC Expires Auth Status Start Date Exp Date PT Visit Limit DA expires DA provider   11/26     10/3 signed          Date of Service 9/3 9/10 9/17 09/24 10/1       Visits Used            Visits Remaining                        Manuals                                                            Neuro Re-Ed            Core canister training  15'                                                                                  Ther Ex            RTR assessment   40' 10'         S/l hip abduction   12 R  13 L           S/l hip circles   10x CW CCW         S/l sit to stand on foam   10x each side         Single leg squat   10x         Blaze pods    8 inch step + 2 inch step 4x30\" 6 pods quick work 8 inch step + 2 inch step 4x30\" 6 pods quick work       Running man   20x + resistance 20x + resistance 20x       reformer   Wheelbarrows both ways 1B Standing series 3 ways 1B,    Half tall kneel rotation w/ pilates ring  1B.   straight legs Standing series 3 ways 1B,    Half tall kneel rotation w/ pilates ring  1B.   straight legs       TM    8'  8' 8'       Ther Activity            Education Anatomy and POC                                                                        Modalities                                           "

## 2024-10-07 NOTE — PROGRESS NOTES
Daily Note     Today's date: 10/7/2024  Patient name: Lisa mSiley  : 1990  MRN: [id number]  Referring provider: Amirah Moreau, MARÍA ELENA  Dx:   Encounter Diagnosis     ICD-10-CM    1. Postpartum state  Z39.2       2. Pelvic floor weakness  N81.89       3. Diastasis recti  M62.08             Patient is  baby boy Ki weighed 6lb7oz at 39W4D via . Self reports Gd 1/2 tear with repair. She is breastfeeding and pumping. Will be RTW 24 works remotely, full-time. 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       She has family nearby.     Reports mild LBP  with holding baby frequently. Better with rest. Denies LBP prior to pregnancy. Mechanism of injury: childbirth             Subjective: Patient offers no new complaints. Overall she is doing well. Notes she is able to perform and practice Kegel's during breastfeeding and brushing her teeth with both quick and endurance holds with positive response.       Objective: See treatment diary below    Postpartum Return to Run Checklist  Baseline symptoms- MET  No symptoms of incontinence or pelvic pain with daily life  No measureable pelvic organ prolapse on vaginal exam  No abdominal wall doming/bulge (diastasis rectus abdominis) with Active Single Leg Raise and Modified Curl Up tests  Pelvic floor muscle strength (via intra-vaginal exam) - DEFERRED ASSESSMENT ON VISIT #2  Rated at least a 3 out of 5 on a scale of 0-5 by your physical therapist via intra-vaginal assessment.  Pelvic floor muscle endurance (in standing) - MET  10 fast repetitions  8-12 repetitions, holding each for 6-8 seconds with maximal strength  60 second contraction holding at least 50% strength  Leg Strength  No deficits in all key hip and abdominal muscles, measured isometrically. MET  Single-leg heel raise: 20 repetitions MET  Single leg bridge: 20 repetitions MET  Single leg sit-to-stand: 20 repetitions MET  Side-lying leg raise (hip abduction): 20 repetitions on  R 12x, L 13x   Load and impact  "assessment with no symptoms of pain, pressure, or incontinence:  Walking 30 minutes at a fast pace (at least 3.5 mph)  Single leg balance 10 sec each leg MET   Single leg squat 10 rep each side PROGRESSING 09/17  Jog on the spot 1 min MET  Forward bounds 10 rep  Hop in place 10 rep each leg MET  Single leg 'running man' (opposite arm and hip flexion/extension with bent knee) for 10 rep each side MET    Current HEP: 10/7/2024  1. PFM + SLR  2. PFM + supine arm flexion 3#  3. PFM quick flicks and 50% endurance holds with BF, 5 second holds with breath while brushing teeth  4. Single leg sit to stand.  5. S/L hip abduction  6. Single leg squat    Access Code: [email or web address]  URL: [email or web address]  Date: 09/10/2024  Prepared by: Amirah Moreau    Program Notes  iLsa, We discussed a few ways to do \"kegels.\" Here are a few suggestions:1. While feeding the baby, do \"quick holds\" for 10 reps. Be sure to relax between each hold. Also do \"endurance holds\" which entail a 50% kegel for 60 seconds. Try to do these during 2-3 feedings a day. 2. While brushing your teeth, combine an exhale and kegel and hold for about 6 seconds. Repeat this for the whole time you are standing and brushing your teeth.     Exercises  - Deep core foundation  - 2 x daily - 7 x weekly  - Small Range Straight Leg Raise  - 1 x daily - 7 x weekly  - Supine Alternating Shoulder Flexion  - 1 x daily - 7 x weekly - 10 reps  - Single leg sit to stand   - 1 x daily - 7 x weekly - 10 reps      Assessment: Tolerated treatment well. Patient performed TM to increase blood flow to the area being treated, prepare the muscle for strength training and lengthening and to improve overall tolerance to activity. Continued with dynamic exercises in coordination with PFM C/R to continue to strengthening PFM and core with good tolerance and demonstrates adequate technique. General muscle fatigue with program; however no onset of symptoms.              Plan: " "Continue per plan of care.      Precautions:   Patient Active Problem List   Diagnosis   (none) - all problems resolved or deleted       PRO EVAL RE-EVAL DISCHARGE   PFDI      SOPHIA-18      VPQ      CPSI-NIH      PGQ          POC Expires Auth Status Start Date Exp Date PT Visit Limit DA expires DA provider   11/26     10/3 signed          Date of Service 9/3 9/10 9/17 09/24 10/1       Visits Used            Visits Remaining                        Manuals                                                            Neuro Re-Ed            Core canister training  15'                                                                                  Ther Ex            RTR assessment   40' 10'         S/l hip abduction   12 R  13 L           S/l hip circles   10x CW CCW         S/l sit to stand on foam   10x each side         Single leg squat   10x         Blaze pods    8 inch step + 2 inch step 4x30\" 6 pods quick work 8 inch step + 2 inch step 4x30\" 6 pods quick work       Running man   20x + resistance 20x + resistance 20x       reformer   Wheelbarrows both ways 1B Standing series 3 ways 1B,    Half tall kneel rotation w/ pilates ring  1B.   straight legs Standing series 3 ways 1B,    Half tall kneel rotation w/ pilates ring  1B.   straight legs       TM    8'  8' 8'       Ther Activity            Education Anatomy and POC                                                                        Modalities                                           "

## 2024-10-08 ENCOUNTER — APPOINTMENT (OUTPATIENT)
Dept: PHYSICAL THERAPY | Facility: REHABILITATION | Age: 34
End: 2024-10-08
Payer: COMMERCIAL

## 2024-10-08 DIAGNOSIS — M62.08 DIASTASIS RECTI: ICD-10-CM

## 2024-10-08 DIAGNOSIS — N81.89 PELVIC FLOOR WEAKNESS: ICD-10-CM

## 2024-10-09 ENCOUNTER — TELEPHONE (OUTPATIENT)
Dept: FAMILY MEDICINE CLINIC | Facility: CLINIC | Age: 34
End: 2024-10-09

## 2024-10-09 DIAGNOSIS — E61.1 IRON DEFICIENCY: Primary | ICD-10-CM

## 2024-10-09 NOTE — TELEPHONE ENCOUNTER
----- Message from Anjum Siu DO sent at 10/9/2024 11:32 AM EDT -----  Iron is very low which I suspect is causing her dizziness - if she can tolerate a daily oral iron supplement she should take that. If she does not tolerate oral iron we can do iron infusions - please have her call to let us know how she is doing on an oral iron supplement in a month.

## 2024-10-09 NOTE — TELEPHONE ENCOUNTER
Called and spoke with patient in regards of her BW results and provider message. Patient verbalzied understanding and will like a Iron Supplement PO. Thank you      -Please send Iron Supplement PO to pharmacy. Thank you

## 2024-10-14 RX ORDER — FERROUS SULFATE 324(65)MG
324 TABLET, DELAYED RELEASE (ENTERIC COATED) ORAL
Qty: 90 TABLET | Refills: 1 | Status: SHIPPED | OUTPATIENT
Start: 2024-10-14

## 2025-02-04 ENCOUNTER — ANNUAL EXAM (OUTPATIENT)
Dept: OBGYN CLINIC | Facility: CLINIC | Age: 35
End: 2025-02-04
Payer: COMMERCIAL

## 2025-02-04 VITALS
SYSTOLIC BLOOD PRESSURE: 110 MMHG | WEIGHT: 145.4 LBS | DIASTOLIC BLOOD PRESSURE: 70 MMHG | BODY MASS INDEX: 24.22 KG/M2 | HEIGHT: 65 IN

## 2025-02-04 DIAGNOSIS — Z12.4 ENCOUNTER FOR SCREENING FOR MALIGNANT NEOPLASM OF CERVIX: ICD-10-CM

## 2025-02-04 DIAGNOSIS — Z11.51 SCREENING FOR HPV (HUMAN PAPILLOMAVIRUS): ICD-10-CM

## 2025-02-04 DIAGNOSIS — Z01.419 WELL WOMAN EXAM WITH ROUTINE GYNECOLOGICAL EXAM: Primary | ICD-10-CM

## 2025-02-04 PROCEDURE — G0145 SCR C/V CYTO,THINLAYER,RESCR: HCPCS | Performed by: OBSTETRICS & GYNECOLOGY

## 2025-02-04 PROCEDURE — G0476 HPV COMBO ASSAY CA SCREEN: HCPCS | Performed by: OBSTETRICS & GYNECOLOGY

## 2025-02-04 PROCEDURE — S0612 ANNUAL GYNECOLOGICAL EXAMINA: HCPCS | Performed by: OBSTETRICS & GYNECOLOGY

## 2025-02-05 LAB
HPV HR 12 DNA CVX QL NAA+PROBE: NEGATIVE
HPV16 DNA CVX QL NAA+PROBE: NEGATIVE
HPV18 DNA CVX QL NAA+PROBE: NEGATIVE

## 2025-02-05 NOTE — PROGRESS NOTES
ASSESSMENT & PLAN:   Diagnoses and all orders for this visit:    Well woman exam with routine gynecological exam  -     Liquid-based pap, screening  -     HPV High Risk    Encounter for screening for malignant neoplasm of cervix  -     Liquid-based pap, screening  -     HPV High Risk    Screening for HPV (human papillomavirus)  -     Liquid-based pap, screening  -     HPV High Risk          The following were reviewed in today's visit: ASCCP guidelines, Gardisil vaccination, STD testing breast self exam, family planning choices, exercise, and healthy diet.    Patient to return to office in yearly for annual exam.     All questions have been answered to her satisfaction.        CC:  Annual Gynecologic Examination  Chief Complaint   Patient presents with    Gynecologic Exam     Lisa Smiley is here for her annual exam; pap ordered.  (Pap 10/28/2022 neg)       HPI: Lisa Smiley is a 34 y.o.  who presents for annual gynecologic examination.  She has the following concerns:  none      Health Maintenance:    Exercise: frequently  Breast exams/breast awareness: yes    Past Medical History:   Diagnosis Date    No pertinent past medical history     Varicella     had chicken pox       Past Surgical History:   Procedure Laterality Date    WISDOM TOOTH EXTRACTION         Past OB/Gyn History:   Patient's last menstrual period was 2025 (exact date).    Last Pap:  : no abnormalities  History of abnormal Pap smear: no  HPV vaccine completed: unknown    Patient is currently sexually active.   STD testing: no  Current contraception:condoms      Family History  Family History   Problem Relation Age of Onset    Asthma Mother     Breast cancer Mother     Asthma Father     No Known Problems Brother     Liver cancer Maternal Grandmother     Parkinsonism Maternal Grandfather        Family history of uterine or ovarian cancer: no  Family history of breast cancer: yes - mother and aunt in mid 50's  Family history of colon  cancer: no    Social History:  Social History     Socioeconomic History    Marital status: /Civil Union     Spouse name: Not on file    Number of children: Not on file    Years of education: Not on file    Highest education level: Not on file   Occupational History    Not on file   Tobacco Use    Smoking status: Never     Passive exposure: Never    Smokeless tobacco: Never   Vaping Use    Vaping status: Never Used   Substance and Sexual Activity    Alcohol use: Not Currently     Comment: socially- prior to pregnancy    Drug use: Never    Sexual activity: Not Currently     Partners: Male     Birth control/protection: None   Other Topics Concern    Not on file   Social History Narrative    Not on file     Social Drivers of Health     Financial Resource Strain: Not on file   Food Insecurity: No Food Insecurity (6/23/2024)    Nursing - Inadequate Food Risk Classification     Worried About Running Out of Food in the Last Year: Never true     Ran Out of Food in the Last Year: Never true     Ran Out of Food in the Last Year: Not on file   Transportation Needs: No Transportation Needs (6/23/2024)    PRAPARE - Transportation     Lack of Transportation (Medical): No     Lack of Transportation (Non-Medical): No   Physical Activity: Not on file   Stress: Not on file   Social Connections: Not on file   Intimate Partner Violence: Not on file   Housing Stability: Low Risk  (6/23/2024)    Housing Stability Vital Sign     Unable to Pay for Housing in the Last Year: No     Number of Times Moved in the Last Year: 0     Homeless in the Last Year: No     Domestic violence screen: negative    Allergies:  No Known Allergies    Medications:    Current Outpatient Medications:     Prenatal MV-Min-Fe Fum-FA-DHA (PRENATAL 1 PO), Take by mouth daily, Disp: , Rfl:     Review of Systems:  Review of Systems   Constitutional: Negative.    HENT: Negative.     Respiratory: Negative.     Cardiovascular: Negative.    Gastrointestinal: Negative.   "  Genitourinary: Negative.    Musculoskeletal: Negative.    Skin: Negative.    Neurological: Negative.    Psychiatric/Behavioral: Negative.           Physical Exam:  /70 (BP Location: Right arm, Patient Position: Sitting, Cuff Size: Standard)   Ht 5' 5\" (1.651 m)   Wt 66 kg (145 lb 6.4 oz)   LMP 01/03/2025 (Exact Date)   Breastfeeding No   BMI 24.20 kg/m²    Physical Exam  Constitutional:       Appearance: Normal appearance.   Genitourinary:      Bladder and urethral meatus normal.      No lesions in the vagina.      Right Labia: No rash, tenderness, lesions, skin changes or Bartholin's cyst.     Left Labia: No tenderness, lesions, skin changes, Bartholin's cyst or rash.     No vaginal erythema, tenderness or bleeding.        Right Adnexa: not tender, not full and no mass present.     Left Adnexa: not tender, not full and no mass present.     Cervix is parous.      No cervical motion tenderness, discharge, lesion or polyp.      Uterus is not enlarged, fixed or tender.      No uterine mass detected.     Urethral meatus caruncle not present.     No urethral tenderness or mass present.   Breasts:     Right: No swelling, bleeding, inverted nipple, mass, nipple discharge, skin change or tenderness.      Left: No swelling, bleeding, inverted nipple, mass, nipple discharge, skin change or tenderness.   HENT:      Head: Normocephalic and atraumatic.   Eyes:      Extraocular Movements: Extraocular movements intact.      Conjunctiva/sclera: Conjunctivae normal.      Pupils: Pupils are equal, round, and reactive to light.   Cardiovascular:      Rate and Rhythm: Normal rate and regular rhythm.      Heart sounds: Normal heart sounds. No murmur heard.  Pulmonary:      Effort: Pulmonary effort is normal. No respiratory distress.      Breath sounds: Normal breath sounds. No wheezing or rales.   Abdominal:      General: There is no distension.      Palpations: Abdomen is soft.      Tenderness: There is no abdominal " tenderness. There is no guarding.   Neurological:      General: No focal deficit present.      Mental Status: She is alert and oriented to person, place, and time.   Skin:     General: Skin is warm and dry.   Psychiatric:         Mood and Affect: Mood normal.         Behavior: Behavior normal.   Vitals and nursing note reviewed.

## 2025-02-11 ENCOUNTER — RESULTS FOLLOW-UP (OUTPATIENT)
Dept: OBGYN CLINIC | Facility: CLINIC | Age: 35
End: 2025-02-11

## 2025-02-11 LAB
LAB AP GYN PRIMARY INTERPRETATION: NORMAL
Lab: NORMAL